# Patient Record
Sex: MALE | Race: BLACK OR AFRICAN AMERICAN | Employment: FULL TIME | URBAN - METROPOLITAN AREA
[De-identification: names, ages, dates, MRNs, and addresses within clinical notes are randomized per-mention and may not be internally consistent; named-entity substitution may affect disease eponyms.]

---

## 2017-09-30 ENCOUNTER — HOSPITAL ENCOUNTER (EMERGENCY)
Age: 27
Discharge: HOME | End: 2017-09-30
Attending: EMERGENCY MEDICINE
Payer: COMMERCIAL

## 2017-09-30 ENCOUNTER — APPOINTMENT (OUTPATIENT)
Dept: GENERAL RADIOLOGY | Age: 27
End: 2017-09-30
Payer: COMMERCIAL

## 2017-09-30 VITALS
DIASTOLIC BLOOD PRESSURE: 82 MMHG | OXYGEN SATURATION: 100 % | TEMPERATURE: 99.2 F | SYSTOLIC BLOOD PRESSURE: 143 MMHG | WEIGHT: 209 LBS | HEART RATE: 98 BPM | RESPIRATION RATE: 16 BRPM | BODY MASS INDEX: 29.26 KG/M2 | HEIGHT: 71 IN
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
## 2017-09-30 DIAGNOSIS — M54.2 NECK PAIN: Primary | ICD-10-CM

## 2017-09-30 DIAGNOSIS — G44.319 ACUTE POST-TRAUMATIC HEADACHE, NOT INTRACTABLE: ICD-10-CM

## 2017-09-30 PROCEDURE — 6370000000 HC RX 637 (ALT 250 FOR IP): Performed by: EMERGENCY MEDICINE

## 2017-09-30 PROCEDURE — 72040 X-RAY EXAM NECK SPINE 2-3 VW: CPT

## 2017-09-30 PROCEDURE — 99283 EMERGENCY DEPT VISIT LOW MDM: CPT

## 2017-09-30 RX ORDER — IBUPROFEN 800 MG/1
800 TABLET ORAL ONCE
Status: COMPLETED | OUTPATIENT
Start: 2017-09-30 | End: 2017-09-30

## 2017-09-30 RX ORDER — DIAZEPAM 5 MG/1
5 TABLET ORAL ONCE
Status: COMPLETED | OUTPATIENT
Start: 2017-09-30 | End: 2017-09-30

## 2017-09-30 RX ORDER — CYCLOBENZAPRINE HCL 10 MG
10 TABLET ORAL 3 TIMES DAILY PRN
Qty: 12 TABLET | Refills: 0 | Status: SHIPPED | OUTPATIENT
Start: 2017-09-30 | End: 2017-10-10

## 2017-09-30 RX ADMIN — IBUPROFEN 800 MG: 800 TABLET, FILM COATED ORAL at 21:56

## 2017-09-30 RX ADMIN — DIAZEPAM 5 MG: 5 TABLET ORAL at 23:18

## 2017-09-30 ASSESSMENT — ENCOUNTER SYMPTOMS
NAUSEA: 0
SINUS PRESSURE: 0
BACK PAIN: 0
DIARRHEA: 0
COUGH: 0
SHORTNESS OF BREATH: 0
RHINORRHEA: 0
VOMITING: 0
BLOOD IN STOOL: 0
SORE THROAT: 0
ABDOMINAL PAIN: 0
CONSTIPATION: 0
PHOTOPHOBIA: 0

## 2017-09-30 ASSESSMENT — PAIN DESCRIPTION - LOCATION: LOCATION: NECK

## 2017-09-30 ASSESSMENT — PAIN SCALES - GENERAL
PAINLEVEL_OUTOF10: 9
PAINLEVEL_OUTOF10: 6
PAINLEVEL_OUTOF10: 9
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
## 2017-10-01 NOTE — ED NOTES
Continue to wait for rolan [place], [facility]  09/30/17 1912
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
## 2018-02-28 ENCOUNTER — TELEPHONE (OUTPATIENT)
Dept: FAMILY MEDICINE CLINIC | Age: 28
End: 2018-02-28

## 2019-03-03 ENCOUNTER — HOSPITAL ENCOUNTER (EMERGENCY)
Age: 29
Discharge: HOME | End: 2019-03-04
Attending: EMERGENCY MEDICINE
Payer: COMMERCIAL

## 2019-03-03 VITALS
TEMPERATURE: 98.9 F | RESPIRATION RATE: 16 BRPM | BODY MASS INDEX: 30.14 KG/M2 | WEIGHT: 215.3 LBS | SYSTOLIC BLOOD PRESSURE: 128 MMHG | HEIGHT: 71 IN | DIASTOLIC BLOOD PRESSURE: 100 MMHG | OXYGEN SATURATION: 100 % | HEART RATE: 78 BPM

## 2019-03-03 DIAGNOSIS — K62.89 RECTAL PAIN: ICD-10-CM

## 2019-03-03 DIAGNOSIS — N34.2 URETHRITIS: Primary | ICD-10-CM

## 2019-03-03 PROCEDURE — 99283 EMERGENCY DEPT VISIT LOW MDM: CPT

## 2019-03-03 PROCEDURE — 6370000000 HC RX 637 (ALT 250 FOR IP): Performed by: EMERGENCY MEDICINE

## 2019-03-03 PROCEDURE — 87491 CHLMYD TRACH DNA AMP PROBE: CPT

## 2019-03-03 PROCEDURE — 6360000002 HC RX W HCPCS: Performed by: EMERGENCY MEDICINE

## 2019-03-03 PROCEDURE — 81001 URINALYSIS AUTO W/SCOPE: CPT

## 2019-03-03 PROCEDURE — 87591 N.GONORRHOEAE DNA AMP PROB: CPT

## 2019-03-03 PROCEDURE — 96372 THER/PROPH/DIAG INJ SC/IM: CPT

## 2019-03-03 RX ORDER — AZITHROMYCIN 250 MG/1
1000 TABLET, FILM COATED ORAL ONCE
Status: COMPLETED | OUTPATIENT
Start: 2019-03-04 | End: 2019-03-03

## 2019-03-03 RX ORDER — CEFTRIAXONE SODIUM 250 MG/1
250 INJECTION, POWDER, FOR SOLUTION INTRAMUSCULAR; INTRAVENOUS ONCE
Status: COMPLETED | OUTPATIENT
Start: 2019-03-04 | End: 2019-03-03

## 2019-03-03 RX ADMIN — AZITHROMYCIN 1000 MG: 250 TABLET, FILM COATED ORAL at 23:55

## 2019-03-03 RX ADMIN — CEFTRIAXONE SODIUM 250 MG: 250 INJECTION, POWDER, FOR SOLUTION INTRAMUSCULAR; INTRAVENOUS at 23:55

## 2019-03-03 ASSESSMENT — PAIN SCALES - GENERAL: PAINLEVEL_OUTOF10: 8

## 2019-03-04 LAB
-: ABNORMAL
AMORPHOUS: ABNORMAL
BACTERIA: ABNORMAL
BILIRUBIN URINE: NEGATIVE
CASTS UA: ABNORMAL /LPF
COLOR: YELLOW
COMMENT UA: ABNORMAL
CRYSTALS, UA: ABNORMAL /HPF
EPITHELIAL CELLS UA: ABNORMAL /HPF (ref 0–5)
GLUCOSE URINE: NEGATIVE
KETONES, URINE: NEGATIVE
LEUKOCYTE ESTERASE, URINE: NEGATIVE
MUCUS: ABNORMAL
NITRITE, URINE: NEGATIVE
OTHER OBSERVATIONS UA: ABNORMAL
PH UA: 6 (ref 5–8)
PROTEIN UA: NEGATIVE
RBC UA: ABNORMAL /HPF (ref 0–2)
RENAL EPITHELIAL, UA: ABNORMAL /HPF
SPECIFIC GRAVITY UA: 1.02 (ref 1–1.03)
TRICHOMONAS: ABNORMAL
TURBIDITY: CLEAR
URINE HGB: ABNORMAL
UROBILINOGEN, URINE: NORMAL
WBC UA: ABNORMAL /HPF (ref 0–5)
YEAST: ABNORMAL

## 2019-03-04 ASSESSMENT — ENCOUNTER SYMPTOMS
EYE PAIN: 0
ABDOMINAL DISTENTION: 0
SHORTNESS OF BREATH: 0
ABDOMINAL PAIN: 0
FACIAL SWELLING: 0
BACK PAIN: 0
CHEST TIGHTNESS: 0
EYE DISCHARGE: 0

## 2019-03-05 LAB
C. TRACHOMATIS DNA ,URINE: NEGATIVE
N. GONORRHOEAE DNA, URINE: NEGATIVE
SPECIMEN DESCRIPTION: NORMAL

## 2019-07-28 ENCOUNTER — HOSPITAL ENCOUNTER (EMERGENCY)
Age: 29
Discharge: HOME | End: 2019-07-28
Attending: EMERGENCY MEDICINE
Payer: COMMERCIAL

## 2019-07-28 VITALS
DIASTOLIC BLOOD PRESSURE: 71 MMHG | HEART RATE: 65 BPM | RESPIRATION RATE: 16 BRPM | TEMPERATURE: 97.7 F | SYSTOLIC BLOOD PRESSURE: 121 MMHG | OXYGEN SATURATION: 100 %

## 2019-07-28 DIAGNOSIS — R30.0 DYSURIA: Primary | ICD-10-CM

## 2019-07-28 LAB
-: NORMAL
AMORPHOUS: NORMAL
BACTERIA: NORMAL
BILIRUBIN URINE: NEGATIVE
CASTS UA: NORMAL /LPF (ref 0–8)
COLOR: YELLOW
COMMENT UA: ABNORMAL
CRYSTALS, UA: NORMAL /HPF
DIRECT EXAM: NORMAL
EPITHELIAL CELLS UA: NORMAL /HPF (ref 0–5)
GLUCOSE URINE: NEGATIVE
HIV AG/AB: NONREACTIVE
KETONES, URINE: NEGATIVE
LEUKOCYTE ESTERASE, URINE: NEGATIVE
Lab: NORMAL
MUCUS: NORMAL
NITRITE, URINE: NEGATIVE
OTHER OBSERVATIONS UA: NORMAL
PH UA: 5.5 (ref 5–8)
PROTEIN UA: NEGATIVE
RBC UA: NORMAL /HPF (ref 0–4)
RENAL EPITHELIAL, UA: NORMAL /HPF
SPECIFIC GRAVITY UA: 1.01 (ref 1–1.03)
SPECIMEN DESCRIPTION: NORMAL
T. PALLIDUM, IGG: NONREACTIVE
TRICHOMONAS: NORMAL
TURBIDITY: CLEAR
URINE HGB: ABNORMAL
UROBILINOGEN, URINE: NORMAL
WBC UA: NORMAL /HPF (ref 0–5)
YEAST: NORMAL

## 2019-07-28 PROCEDURE — 87491 CHLMYD TRACH DNA AMP PROBE: CPT

## 2019-07-28 PROCEDURE — 86780 TREPONEMA PALLIDUM: CPT

## 2019-07-28 PROCEDURE — 87591 N.GONORRHOEAE DNA AMP PROB: CPT

## 2019-07-28 PROCEDURE — 2580000003 HC RX 258

## 2019-07-28 PROCEDURE — 81001 URINALYSIS AUTO W/SCOPE: CPT

## 2019-07-28 PROCEDURE — 99283 EMERGENCY DEPT VISIT LOW MDM: CPT

## 2019-07-28 PROCEDURE — 87389 HIV-1 AG W/HIV-1&-2 AB AG IA: CPT

## 2019-07-28 PROCEDURE — 6370000000 HC RX 637 (ALT 250 FOR IP): Performed by: STUDENT IN AN ORGANIZED HEALTH CARE EDUCATION/TRAINING PROGRAM

## 2019-07-28 PROCEDURE — 6360000002 HC RX W HCPCS: Performed by: STUDENT IN AN ORGANIZED HEALTH CARE EDUCATION/TRAINING PROGRAM

## 2019-07-28 PROCEDURE — 87210 SMEAR WET MOUNT SALINE/INK: CPT

## 2019-07-28 PROCEDURE — 96372 THER/PROPH/DIAG INJ SC/IM: CPT

## 2019-07-28 RX ORDER — 0.9 % SODIUM CHLORIDE 0.9 %
VIAL (ML) INJECTION
Status: COMPLETED
Start: 2019-07-28 | End: 2019-07-28

## 2019-07-28 RX ORDER — CEFTRIAXONE SODIUM 250 MG/1
250 INJECTION, POWDER, FOR SOLUTION INTRAMUSCULAR; INTRAVENOUS ONCE
Status: COMPLETED | OUTPATIENT
Start: 2019-07-28 | End: 2019-07-28

## 2019-07-28 RX ORDER — AZITHROMYCIN 250 MG/1
1000 TABLET, FILM COATED ORAL ONCE
Status: COMPLETED | OUTPATIENT
Start: 2019-07-28 | End: 2019-07-28

## 2019-07-28 RX ADMIN — CEFTRIAXONE SODIUM 250 MG: 250 INJECTION, POWDER, FOR SOLUTION INTRAMUSCULAR; INTRAVENOUS at 06:41

## 2019-07-28 RX ADMIN — SODIUM CHLORIDE 10 ML: 9 INJECTION, SOLUTION INTRAMUSCULAR; INTRAVENOUS; SUBCUTANEOUS at 06:42

## 2019-07-28 RX ADMIN — AZITHROMYCIN 1000 MG: 250 TABLET, FILM COATED ORAL at 06:42

## 2019-07-28 ASSESSMENT — ENCOUNTER SYMPTOMS
VOMITING: 0
ABDOMINAL PAIN: 0
NAUSEA: 0
SHORTNESS OF BREATH: 0

## 2019-07-28 NOTE — ED TRIAGE NOTES
C/o burning on urination started saturday, denies any discharge. Provided speci cup for for urine sample. Dr Daysi Cade at bedside.

## 2021-04-15 ENCOUNTER — HOSPITAL ENCOUNTER (INPATIENT)
Age: 31
LOS: 1 days | Discharge: HOME | DRG: 280 | End: 2021-04-16
Attending: EMERGENCY MEDICINE | Admitting: INTERNAL MEDICINE
Payer: COMMERCIAL

## 2021-04-15 ENCOUNTER — APPOINTMENT (OUTPATIENT)
Dept: GENERAL RADIOLOGY | Age: 31
DRG: 280 | End: 2021-04-15

## 2021-04-15 DIAGNOSIS — I21.4 NSTEMI (NON-ST ELEVATED MYOCARDIAL INFARCTION) (HCC): Primary | ICD-10-CM

## 2021-04-15 PROBLEM — I40.9 ACUTE MYOCARDITIS: Status: ACTIVE | Noted: 2021-04-15

## 2021-04-15 LAB
ABSOLUTE EOS #: <0.03 K/UL (ref 0–0.44)
ABSOLUTE IMMATURE GRANULOCYTE: 0.02 K/UL (ref 0–0.3)
ABSOLUTE LYMPH #: 2.59 K/UL (ref 1.1–3.7)
ABSOLUTE MONO #: 0.58 K/UL (ref 0.1–1.2)
ALBUMIN SERPL-MCNC: 4.1 G/DL (ref 3.5–5.2)
ALBUMIN/GLOBULIN RATIO: ABNORMAL (ref 1–2.5)
ALP BLD-CCNC: 67 U/L (ref 40–129)
ALT SERPL-CCNC: 28 U/L (ref 5–41)
AMPHETAMINE SCREEN URINE: NEGATIVE
AMYLASE: 60 U/L (ref 28–100)
ANION GAP SERPL CALCULATED.3IONS-SCNC: 12 MMOL/L (ref 9–17)
AST SERPL-CCNC: 48 U/L
BARBITURATE SCREEN URINE: NEGATIVE
BASOPHILS # BLD: 0 % (ref 0–2)
BASOPHILS ABSOLUTE: <0.03 K/UL (ref 0–0.2)
BENZODIAZEPINE SCREEN, URINE: NEGATIVE
BILIRUB SERPL-MCNC: 0.23 MG/DL (ref 0.3–1.2)
BILIRUBIN DIRECT: 0.08 MG/DL
BILIRUBIN, INDIRECT: 0.15 MG/DL (ref 0–1)
BUN BLDV-MCNC: 9 MG/DL (ref 6–20)
BUN/CREAT BLD: 12 (ref 9–20)
BUPRENORPHINE URINE: NORMAL
C-REACTIVE PROTEIN: 39.2 MG/L (ref 0–5)
CALCIUM SERPL-MCNC: 8.9 MG/DL (ref 8.6–10.4)
CANNABINOID SCREEN URINE: NEGATIVE
CHLORIDE BLD-SCNC: 99 MMOL/L (ref 98–107)
CO2: 26 MMOL/L (ref 20–31)
COCAINE METABOLITE, URINE: NEGATIVE
CREAT SERPL-MCNC: 0.73 MG/DL (ref 0.7–1.2)
D-DIMER QUANTITATIVE: 0.34 MG/L FEU (ref 0–0.59)
DIFFERENTIAL TYPE: ABNORMAL
EKG ATRIAL RATE: 82 BPM
EKG ATRIAL RATE: 89 BPM
EKG P AXIS: 44 DEGREES
EKG P AXIS: 45 DEGREES
EKG P-R INTERVAL: 138 MS
EKG P-R INTERVAL: 140 MS
EKG Q-T INTERVAL: 364 MS
EKG Q-T INTERVAL: 368 MS
EKG QRS DURATION: 84 MS
EKG QRS DURATION: 84 MS
EKG QTC CALCULATION (BAZETT): 425 MS
EKG QTC CALCULATION (BAZETT): 447 MS
EKG R AXIS: -8 DEGREES
EKG R AXIS: 16 DEGREES
EKG T AXIS: 28 DEGREES
EKG T AXIS: 47 DEGREES
EKG VENTRICULAR RATE: 82 BPM
EKG VENTRICULAR RATE: 89 BPM
EOSINOPHILS RELATIVE PERCENT: 0 % (ref 1–4)
GFR AFRICAN AMERICAN: >60 ML/MIN
GFR NON-AFRICAN AMERICAN: >60 ML/MIN
GFR SERPL CREATININE-BSD FRML MDRD: ABNORMAL ML/MIN/{1.73_M2}
GFR SERPL CREATININE-BSD FRML MDRD: ABNORMAL ML/MIN/{1.73_M2}
GLOBULIN: ABNORMAL G/DL (ref 1.5–3.8)
GLUCOSE BLD-MCNC: 129 MG/DL (ref 70–99)
HCT VFR BLD CALC: 40.2 % (ref 40.7–50.3)
HEMOGLOBIN: 13.6 G/DL (ref 13–17)
IMMATURE GRANULOCYTES: 0 %
INR BLD: 1
LACTIC ACID: 0.7 MMOL/L (ref 0.5–2.2)
LACTIC ACID: 1 MMOL/L (ref 0.5–2.2)
LIPASE: 26 U/L (ref 13–60)
LV EF: 53 %
LV EF: 55 %
LVEF MODALITY: NORMAL
LVEF MODALITY: NORMAL
LYMPHOCYTES # BLD: 40 % (ref 24–43)
MAGNESIUM: 2.1 MG/DL (ref 1.6–2.6)
MCH RBC QN AUTO: 32.2 PG (ref 25.2–33.5)
MCHC RBC AUTO-ENTMCNC: 33.8 G/DL (ref 28.4–34.8)
MCV RBC AUTO: 95 FL (ref 82.6–102.9)
MDMA URINE: NORMAL
METHADONE SCREEN, URINE: NEGATIVE
METHAMPHETAMINE, URINE: NORMAL
MONOCYTES # BLD: 9 % (ref 3–12)
NRBC AUTOMATED: 0 PER 100 WBC
OPIATES, URINE: NEGATIVE
OXYCODONE SCREEN URINE: NEGATIVE
PARTIAL THROMBOPLASTIN TIME: 37.7 SEC (ref 23.9–33.8)
PDW BLD-RTO: 12.6 % (ref 11.8–14.4)
PHENCYCLIDINE, URINE: NEGATIVE
PLATELET # BLD: 151 K/UL (ref 138–453)
PLATELET ESTIMATE: ABNORMAL
PMV BLD AUTO: 11.7 FL (ref 8.1–13.5)
POTASSIUM SERPL-SCNC: 3.5 MMOL/L (ref 3.7–5.3)
PROCALCITONIN: 0.08 NG/ML
PROPOXYPHENE, URINE: NORMAL
PROTHROMBIN TIME: 12.6 SEC (ref 11.5–14.2)
RBC # BLD: 4.23 M/UL (ref 4.21–5.77)
RBC # BLD: ABNORMAL 10*6/UL
SEDIMENTATION RATE, ERYTHROCYTE: 27 MM (ref 0–15)
SEG NEUTROPHILS: 50 % (ref 36–65)
SEGMENTED NEUTROPHILS ABSOLUTE COUNT: 3.23 K/UL (ref 1.5–8.1)
SODIUM BLD-SCNC: 137 MMOL/L (ref 135–144)
TEST INFORMATION: NORMAL
TOTAL PROTEIN: 6.6 G/DL (ref 6.4–8.3)
TRICYCLIC ANTIDEPRESSANTS, UR: NORMAL
TROPONIN INTERP: ABNORMAL
TROPONIN T: ABNORMAL NG/ML
TROPONIN, HIGH SENSITIVITY: 775 NG/L (ref 0–22)
TROPONIN, HIGH SENSITIVITY: 805 NG/L (ref 0–22)
TROPONIN, HIGH SENSITIVITY: 970 NG/L (ref 0–22)
TROPONIN, HIGH SENSITIVITY: 995 NG/L (ref 0–22)
WBC # BLD: 6.5 K/UL (ref 3.5–11.3)
WBC # BLD: ABNORMAL 10*3/UL

## 2021-04-15 PROCEDURE — 84484 ASSAY OF TROPONIN QUANT: CPT

## 2021-04-15 PROCEDURE — APPNB30 APP NON BILLABLE TIME 0-30 MINS: Performed by: NURSE PRACTITIONER

## 2021-04-15 PROCEDURE — C1894 INTRO/SHEATH, NON-LASER: HCPCS

## 2021-04-15 PROCEDURE — 85730 THROMBOPLASTIN TIME PARTIAL: CPT

## 2021-04-15 PROCEDURE — APPSS45 APP SPLIT SHARED TIME 31-45 MINUTES: Performed by: NURSE PRACTITIONER

## 2021-04-15 PROCEDURE — 6370000000 HC RX 637 (ALT 250 FOR IP): Performed by: NURSE PRACTITIONER

## 2021-04-15 PROCEDURE — 86665 EPSTEIN-BARR CAPSID VCA: CPT

## 2021-04-15 PROCEDURE — 84145 PROCALCITONIN (PCT): CPT

## 2021-04-15 PROCEDURE — 93306 TTE W/DOPPLER COMPLETE: CPT

## 2021-04-15 PROCEDURE — 2709999900 HC NON-CHARGEABLE SUPPLY

## 2021-04-15 PROCEDURE — 86658 ENTEROVIRUS ANTIBODY: CPT

## 2021-04-15 PROCEDURE — B2151ZZ FLUOROSCOPY OF LEFT HEART USING LOW OSMOLAR CONTRAST: ICD-10-PCS | Performed by: INTERNAL MEDICINE

## 2021-04-15 PROCEDURE — 80048 BASIC METABOLIC PNL TOTAL CA: CPT

## 2021-04-15 PROCEDURE — 85610 PROTHROMBIN TIME: CPT

## 2021-04-15 PROCEDURE — 80307 DRUG TEST PRSMV CHEM ANLYZR: CPT

## 2021-04-15 PROCEDURE — 6360000004 HC RX CONTRAST MEDICATION

## 2021-04-15 PROCEDURE — 36415 COLL VENOUS BLD VENIPUNCTURE: CPT

## 2021-04-15 PROCEDURE — 85025 COMPLETE CBC W/AUTO DIFF WBC: CPT

## 2021-04-15 PROCEDURE — 71045 X-RAY EXAM CHEST 1 VIEW: CPT

## 2021-04-15 PROCEDURE — 85652 RBC SED RATE AUTOMATED: CPT

## 2021-04-15 PROCEDURE — 83735 ASSAY OF MAGNESIUM: CPT

## 2021-04-15 PROCEDURE — 86663 EPSTEIN-BARR ANTIBODY: CPT

## 2021-04-15 PROCEDURE — 85379 FIBRIN DEGRADATION QUANT: CPT

## 2021-04-15 PROCEDURE — 80074 ACUTE HEPATITIS PANEL: CPT

## 2021-04-15 PROCEDURE — 87040 BLOOD CULTURE FOR BACTERIA: CPT

## 2021-04-15 PROCEDURE — U0003 INFECTIOUS AGENT DETECTION BY NUCLEIC ACID (DNA OR RNA); SEVERE ACUTE RESPIRATORY SYNDROME CORONAVIRUS 2 (SARS-COV-2) (CORONAVIRUS DISEASE [COVID-19]), AMPLIFIED PROBE TECHNIQUE, MAKING USE OF HIGH THROUGHPUT TECHNOLOGIES AS DESCRIBED BY CMS-2020-01-R: HCPCS

## 2021-04-15 PROCEDURE — 96375 TX/PRO/DX INJ NEW DRUG ADDON: CPT

## 2021-04-15 PROCEDURE — 86038 ANTINUCLEAR ANTIBODIES: CPT

## 2021-04-15 PROCEDURE — 6360000002 HC RX W HCPCS

## 2021-04-15 PROCEDURE — 2060000000 HC ICU INTERMEDIATE R&B

## 2021-04-15 PROCEDURE — 93005 ELECTROCARDIOGRAM TRACING: CPT | Performed by: EMERGENCY MEDICINE

## 2021-04-15 PROCEDURE — 86140 C-REACTIVE PROTEIN: CPT

## 2021-04-15 PROCEDURE — 2580000003 HC RX 258: Performed by: INTERNAL MEDICINE

## 2021-04-15 PROCEDURE — 99222 1ST HOSP IP/OBS MODERATE 55: CPT | Performed by: NURSE PRACTITIONER

## 2021-04-15 PROCEDURE — 82150 ASSAY OF AMYLASE: CPT

## 2021-04-15 PROCEDURE — 80076 HEPATIC FUNCTION PANEL: CPT

## 2021-04-15 PROCEDURE — 6370000000 HC RX 637 (ALT 250 FOR IP): Performed by: EMERGENCY MEDICINE

## 2021-04-15 PROCEDURE — 99283 EMERGENCY DEPT VISIT LOW MDM: CPT

## 2021-04-15 PROCEDURE — 86644 CMV ANTIBODY: CPT

## 2021-04-15 PROCEDURE — 96365 THER/PROPH/DIAG IV INF INIT: CPT

## 2021-04-15 PROCEDURE — 83605 ASSAY OF LACTIC ACID: CPT

## 2021-04-15 PROCEDURE — 83690 ASSAY OF LIPASE: CPT

## 2021-04-15 PROCEDURE — 2500000003 HC RX 250 WO HCPCS

## 2021-04-15 PROCEDURE — B2111ZZ FLUOROSCOPY OF MULTIPLE CORONARY ARTERIES USING LOW OSMOLAR CONTRAST: ICD-10-PCS | Performed by: INTERNAL MEDICINE

## 2021-04-15 PROCEDURE — U0005 INFEC AGEN DETEC AMPLI PROBE: HCPCS

## 2021-04-15 PROCEDURE — 4A023N7 MEASUREMENT OF CARDIAC SAMPLING AND PRESSURE, LEFT HEART, PERCUTANEOUS APPROACH: ICD-10-PCS | Performed by: INTERNAL MEDICINE

## 2021-04-15 PROCEDURE — 86664 EPSTEIN-BARR NUCLEAR ANTIGEN: CPT

## 2021-04-15 PROCEDURE — 6360000002 HC RX W HCPCS: Performed by: EMERGENCY MEDICINE

## 2021-04-15 RX ORDER — NITROGLYCERIN 0.4 MG/1
0.4 TABLET SUBLINGUAL EVERY 5 MIN PRN
Status: DISCONTINUED | OUTPATIENT
Start: 2021-04-15 | End: 2021-04-16 | Stop reason: HOSPADM

## 2021-04-15 RX ORDER — ONDANSETRON 2 MG/ML
4 INJECTION INTRAMUSCULAR; INTRAVENOUS EVERY 6 HOURS PRN
Status: DISCONTINUED | OUTPATIENT
Start: 2021-04-15 | End: 2021-04-16 | Stop reason: HOSPADM

## 2021-04-15 RX ORDER — HEPARIN SODIUM 1000 [USP'U]/ML
4000 INJECTION, SOLUTION INTRAVENOUS; SUBCUTANEOUS ONCE
Status: COMPLETED | OUTPATIENT
Start: 2021-04-15 | End: 2021-04-15

## 2021-04-15 RX ORDER — SODIUM CHLORIDE 9 MG/ML
INJECTION, SOLUTION INTRAVENOUS CONTINUOUS
Status: ACTIVE | OUTPATIENT
Start: 2021-04-15 | End: 2021-04-15

## 2021-04-15 RX ORDER — MORPHINE SULFATE 4 MG/ML
4 INJECTION, SOLUTION INTRAMUSCULAR; INTRAVENOUS ONCE
Status: COMPLETED | OUTPATIENT
Start: 2021-04-15 | End: 2021-04-15

## 2021-04-15 RX ORDER — ATORVASTATIN CALCIUM 40 MG/1
40 TABLET, FILM COATED ORAL NIGHTLY
Status: DISCONTINUED | OUTPATIENT
Start: 2021-04-15 | End: 2021-04-16 | Stop reason: HOSPADM

## 2021-04-15 RX ORDER — HEPARIN SODIUM 1000 [USP'U]/ML
2000 INJECTION, SOLUTION INTRAVENOUS; SUBCUTANEOUS PRN
Status: DISCONTINUED | OUTPATIENT
Start: 2021-04-15 | End: 2021-04-15

## 2021-04-15 RX ORDER — HEPARIN SODIUM 1000 [USP'U]/ML
2000 INJECTION, SOLUTION INTRAVENOUS; SUBCUTANEOUS PRN
Status: DISCONTINUED | OUTPATIENT
Start: 2021-04-15 | End: 2021-04-15 | Stop reason: SDUPTHER

## 2021-04-15 RX ORDER — HEPARIN SODIUM 10000 [USP'U]/100ML
460-1000 INJECTION, SOLUTION INTRAVENOUS CONTINUOUS
Status: DISCONTINUED | OUTPATIENT
Start: 2021-04-15 | End: 2021-04-15 | Stop reason: SDUPTHER

## 2021-04-15 RX ORDER — POTASSIUM CHLORIDE 20 MEQ/1
40 TABLET, EXTENDED RELEASE ORAL ONCE
Status: COMPLETED | OUTPATIENT
Start: 2021-04-15 | End: 2021-04-15

## 2021-04-15 RX ORDER — SODIUM CHLORIDE 0.9 % (FLUSH) 0.9 %
10 SYRINGE (ML) INJECTION PRN
Status: DISCONTINUED | OUTPATIENT
Start: 2021-04-15 | End: 2021-04-15 | Stop reason: SDUPTHER

## 2021-04-15 RX ORDER — ACETAMINOPHEN 325 MG/1
650 TABLET ORAL EVERY 4 HOURS PRN
Status: DISCONTINUED | OUTPATIENT
Start: 2021-04-15 | End: 2021-04-16 | Stop reason: HOSPADM

## 2021-04-15 RX ORDER — SODIUM CHLORIDE 9 MG/ML
25 INJECTION, SOLUTION INTRAVENOUS PRN
Status: DISCONTINUED | OUTPATIENT
Start: 2021-04-15 | End: 2021-04-16 | Stop reason: HOSPADM

## 2021-04-15 RX ORDER — CLINDAMYCIN HYDROCHLORIDE 150 MG/1
300 CAPSULE ORAL EVERY 6 HOURS SCHEDULED
Status: DISCONTINUED | OUTPATIENT
Start: 2021-04-15 | End: 2021-04-16 | Stop reason: HOSPADM

## 2021-04-15 RX ORDER — ASPIRIN 81 MG/1
324 TABLET, CHEWABLE ORAL ONCE
Status: COMPLETED | OUTPATIENT
Start: 2021-04-15 | End: 2021-04-15

## 2021-04-15 RX ORDER — ACETAMINOPHEN 325 MG/1
650 TABLET ORAL EVERY 6 HOURS PRN
Status: DISCONTINUED | OUTPATIENT
Start: 2021-04-15 | End: 2021-04-15 | Stop reason: SDUPTHER

## 2021-04-15 RX ORDER — LISINOPRIL 5 MG/1
5 TABLET ORAL DAILY
Status: DISCONTINUED | OUTPATIENT
Start: 2021-04-15 | End: 2021-04-16 | Stop reason: HOSPADM

## 2021-04-15 RX ORDER — SODIUM CHLORIDE 0.9 % (FLUSH) 0.9 %
5-40 SYRINGE (ML) INJECTION EVERY 12 HOURS SCHEDULED
Status: DISCONTINUED | OUTPATIENT
Start: 2021-04-15 | End: 2021-04-16 | Stop reason: HOSPADM

## 2021-04-15 RX ORDER — HEPARIN SODIUM 1000 [USP'U]/ML
4000 INJECTION, SOLUTION INTRAVENOUS; SUBCUTANEOUS PRN
Status: DISCONTINUED | OUTPATIENT
Start: 2021-04-15 | End: 2021-04-15 | Stop reason: SDUPTHER

## 2021-04-15 RX ORDER — HEPARIN SODIUM 1000 [USP'U]/ML
4000 INJECTION, SOLUTION INTRAVENOUS; SUBCUTANEOUS PRN
Status: DISCONTINUED | OUTPATIENT
Start: 2021-04-15 | End: 2021-04-15

## 2021-04-15 RX ORDER — ACETAMINOPHEN 650 MG/1
650 SUPPOSITORY RECTAL EVERY 6 HOURS PRN
Status: DISCONTINUED | OUTPATIENT
Start: 2021-04-15 | End: 2021-04-16 | Stop reason: HOSPADM

## 2021-04-15 RX ORDER — POTASSIUM CHLORIDE 7.45 MG/ML
10 INJECTION INTRAVENOUS PRN
Status: DISCONTINUED | OUTPATIENT
Start: 2021-04-15 | End: 2021-04-16 | Stop reason: HOSPADM

## 2021-04-15 RX ORDER — HEPARIN SODIUM 1000 [USP'U]/ML
60 INJECTION, SOLUTION INTRAVENOUS; SUBCUTANEOUS ONCE
Status: CANCELLED | OUTPATIENT
Start: 2021-04-15 | End: 2021-04-15

## 2021-04-15 RX ORDER — FAMOTIDINE 20 MG/1
20 TABLET, FILM COATED ORAL 2 TIMES DAILY
Status: DISCONTINUED | OUTPATIENT
Start: 2021-04-15 | End: 2021-04-16 | Stop reason: HOSPADM

## 2021-04-15 RX ORDER — POTASSIUM CHLORIDE 20 MEQ/1
40 TABLET, EXTENDED RELEASE ORAL PRN
Status: DISCONTINUED | OUTPATIENT
Start: 2021-04-15 | End: 2021-04-16 | Stop reason: HOSPADM

## 2021-04-15 RX ORDER — SODIUM CHLORIDE 0.9 % (FLUSH) 0.9 %
5-40 SYRINGE (ML) INJECTION EVERY 12 HOURS SCHEDULED
Status: DISCONTINUED | OUTPATIENT
Start: 2021-04-15 | End: 2021-04-15 | Stop reason: SDUPTHER

## 2021-04-15 RX ORDER — ALPRAZOLAM 0.5 MG/1
0.5 TABLET ORAL ONCE
Status: COMPLETED | OUTPATIENT
Start: 2021-04-15 | End: 2021-04-15

## 2021-04-15 RX ORDER — ASPIRIN 81 MG/1
81 TABLET, CHEWABLE ORAL DAILY
Status: DISCONTINUED | OUTPATIENT
Start: 2021-04-16 | End: 2021-04-15

## 2021-04-15 RX ORDER — ONDANSETRON 2 MG/ML
4 INJECTION INTRAMUSCULAR; INTRAVENOUS EVERY 6 HOURS PRN
Status: DISCONTINUED | OUTPATIENT
Start: 2021-04-15 | End: 2021-04-15 | Stop reason: SDUPTHER

## 2021-04-15 RX ORDER — PROMETHAZINE HYDROCHLORIDE 12.5 MG/1
12.5 TABLET ORAL EVERY 6 HOURS PRN
Status: DISCONTINUED | OUTPATIENT
Start: 2021-04-15 | End: 2021-04-16 | Stop reason: HOSPADM

## 2021-04-15 RX ORDER — MAGNESIUM SULFATE 1 G/100ML
1000 INJECTION INTRAVENOUS PRN
Status: DISCONTINUED | OUTPATIENT
Start: 2021-04-15 | End: 2021-04-16 | Stop reason: HOSPADM

## 2021-04-15 RX ORDER — ONDANSETRON 2 MG/ML
4 INJECTION INTRAMUSCULAR; INTRAVENOUS ONCE
Status: COMPLETED | OUTPATIENT
Start: 2021-04-15 | End: 2021-04-15

## 2021-04-15 RX ORDER — FENTANYL CITRATE 50 UG/ML
25 INJECTION, SOLUTION INTRAMUSCULAR; INTRAVENOUS
Status: ACTIVE | OUTPATIENT
Start: 2021-04-15 | End: 2021-04-15

## 2021-04-15 RX ORDER — SODIUM CHLORIDE 0.9 % (FLUSH) 0.9 %
5-40 SYRINGE (ML) INJECTION PRN
Status: DISCONTINUED | OUTPATIENT
Start: 2021-04-15 | End: 2021-04-16 | Stop reason: HOSPADM

## 2021-04-15 RX ORDER — HEPARIN SODIUM 10000 [USP'U]/100ML
5-30 INJECTION, SOLUTION INTRAVENOUS CONTINUOUS
Status: DISCONTINUED | OUTPATIENT
Start: 2021-04-15 | End: 2021-04-15

## 2021-04-15 RX ADMIN — ALPRAZOLAM 0.5 MG: 0.5 TABLET ORAL at 21:27

## 2021-04-15 RX ADMIN — HEPARIN SODIUM 4000 UNITS: 1000 INJECTION INTRAVENOUS; SUBCUTANEOUS at 10:35

## 2021-04-15 RX ADMIN — ONDANSETRON 4 MG: 2 INJECTION INTRAMUSCULAR; INTRAVENOUS at 10:35

## 2021-04-15 RX ADMIN — HEPARIN SODIUM 10.9 UNITS/KG/HR: 10000 INJECTION, SOLUTION INTRAVENOUS at 10:35

## 2021-04-15 RX ADMIN — METOPROLOL TARTRATE 25 MG: 25 TABLET, FILM COATED ORAL at 20:35

## 2021-04-15 RX ADMIN — METOPROLOL TARTRATE 25 MG: 25 TABLET, FILM COATED ORAL at 12:45

## 2021-04-15 RX ADMIN — CLINDAMYCIN HYDROCHLORIDE 300 MG: 150 CAPSULE ORAL at 12:45

## 2021-04-15 RX ADMIN — CLINDAMYCIN HYDROCHLORIDE 300 MG: 150 CAPSULE ORAL at 20:35

## 2021-04-15 RX ADMIN — ASPIRIN 324 MG: 81 TABLET, CHEWABLE ORAL at 10:35

## 2021-04-15 RX ADMIN — ATORVASTATIN CALCIUM 40 MG: 40 TABLET, FILM COATED ORAL at 20:35

## 2021-04-15 RX ADMIN — Medication 4 MG: at 10:52

## 2021-04-15 RX ADMIN — SODIUM CHLORIDE: 9 INJECTION, SOLUTION INTRAVENOUS at 16:20

## 2021-04-15 RX ADMIN — FAMOTIDINE 20 MG: 20 TABLET ORAL at 20:35

## 2021-04-15 RX ADMIN — POTASSIUM CHLORIDE 40 MEQ: 1500 TABLET, EXTENDED RELEASE ORAL at 11:55

## 2021-04-15 ASSESSMENT — ENCOUNTER SYMPTOMS
COLOR CHANGE: 0
EYE DISCHARGE: 0
PHOTOPHOBIA: 0
SHORTNESS OF BREATH: 0
WHEEZING: 0
ABDOMINAL DISTENTION: 0
EYE PAIN: 0
SINUS PAIN: 0
ABDOMINAL PAIN: 0
BACK PAIN: 0
SINUS PRESSURE: 0
CHEST TIGHTNESS: 1
FACIAL SWELLING: 0

## 2021-04-15 ASSESSMENT — PAIN SCALES - GENERAL
PAINLEVEL_OUTOF10: 0
PAINLEVEL_OUTOF10: 4

## 2021-04-15 NOTE — FLOWSHEET NOTE
Writer was called by ED registration to walk up family member for pt to [address] [place] [address] waiting room. Upon arrival other family members were in waiting room as well for pt. Writer offered them water and coffee for which they were greatly appreciative. Writer nurtured hope and ministry presence to family. Chaplains will remain available to offer spiritual and emotional support as needed. 04/15/21 1519   Encounter Summary   Services provided to: Family   Referral/Consult From: Multi-disciplinary team   Support System Family members   Continue Visiting   (4/15/21)   Complexity of Encounter Moderate   Length of Encounter 30 minutes   Spiritual Assessment Completed Yes   Routine   Type Initial   Assessment Calm; Approachable;Coping   Intervention Explored feelings, thoughts, concerns;Nurtured hope;Sustaining presence/ Ministry of presence;Develop care plan   Outcome Acceptance;Expressed gratitude;Expressed feelings/needs/concerns;Coping;Encouraged; Hopeful;Receptive     Electronically signed by Ana Richard on 4/15/2021 at 3:21 PM.  Aldo  [phone]

## 2021-04-15 NOTE — ED PROVIDER NOTES
EMERGENCY DEPARTMENT ENCOUNTER    Pt Name: Grady Roque  MRN: [id number]  [place] 1990  Date of evaluation: 4/15/21  CHIEF COMPLAINT       Chief Complaint   Patient presents with    Chest Pain     epigastric    Abdominal Pain     HISTORY OF PRESENT ILLNESS   HPI   Patient is a 77-year-old male who presented to the emergency department secondary to chest pain. Patient complains of chest pain that began on Tuesday while at rest localized to the midsternal region nonradiating no associated nausea vomiting or diaphoresis. Patient's states pain is been constant since onset gotten progressively worse, increases with inspiration. The pain 4 out of 10 on the pain scale. Initially thought it was related to food but he is not been eating. He was at work today and was sent home due to increased pain. Patient initially did not come in because \"in a black people not come to the doctor\". Patient denied recent travel. No family history of clotting disorder. Patient denies tobacco use occasionally wakes. Occasional alcohol use no drug use. Patient has a family history of hypertension and coronary artery disease. Hossein Daugherty He has never had a heart attack or had a stress test.  Spine patient denied nausea, vomiting, fevers or chills    REVIEW OF SYSTEMS     Review of Systems   Constitutional: Negative for chills, diaphoresis and fever. HENT: Negative for congestion, ear pain and facial swelling. Eyes: Negative for pain, discharge and visual disturbance. Respiratory: Positive for chest tightness. Negative for shortness of breath. Cardiovascular: Positive for chest pain. Negative for palpitations and leg swelling. Gastrointestinal: Negative for abdominal distention and abdominal pain. Genitourinary: Negative for difficulty urinating and flank pain. Musculoskeletal: Negative for back pain. Skin: Negative for wound. Neurological: Negative for dizziness, light-headedness and headaches.      PASTMEDICAL HISTORY   No past medical history on file. SURGICAL HISTORY     No past surgical history on file. CURRENT MEDICATIONS       Current Discharge Medication List      CONTINUE these medications which have NOT CHANGED    Details   ibuprofen (ADVIL;MOTRIN) 800 MG tablet Take 1 tablet by mouth every 8 hours as needed for Pain  Qty: 30 tablet, Refills: 0           ALLERGIES     has No Known Allergies. FAMILY HISTORY     has no family status information on file. SOCIAL HISTORY       Social History     Tobacco Use    Smoking status: Never Smoker    Smokeless tobacco: Never Used   Substance Use Topics    Alcohol use: No    Drug use: No     PHYSICAL EXAM     INITIAL VITALS: BP (!) 124/99   Pulse 87   Temp 98.3 °F (36.8 °C)   Resp 14   Ht 5' 10.98\" (1.803 m)   Wt 202 lb 11.2 oz (91.9 kg)   SpO2 97%   BMI 28.28 kg/m²    Physical Exam  Vitals signs and nursing note reviewed. Constitutional:       General: He is not in acute distress. Appearance: He is well-developed. He is not diaphoretic. HENT:      Head: Normocephalic and atraumatic. Eyes:      Pupils: Pupils are equal, round, and reactive to light. Neck:      Musculoskeletal: Normal range of motion and neck supple. Cardiovascular:      Rate and Rhythm: Normal rate and regular rhythm. Pulmonary:      Effort: Pulmonary effort is normal.      Breath sounds: Normal breath sounds. Abdominal:      General: Bowel sounds are normal.      Palpations: Abdomen is soft. Musculoskeletal: Normal range of motion. Skin:     General: Skin is warm. Capillary Refill: Capillary refill takes less than 2 seconds. Neurological:      Mental Status: He is alert and oriented to person, place, and time. MEDICAL DECISION MAKING:   Patient is a 63-year-old male who presented to the emergency room secondary to chest pain. EKG obtained on arrival normal sinus rhythm no acute ST wave elevations or findings consistent with STEMI.   Labs obtained including Placed This Encounter   Medications    aspirin chewable tablet 324 mg    morphine sulfate (PF) injection 4 mg    ondansetron (ZOFRAN) injection 4 mg    heparin (porcine) injection 4,000 Units    DISCONTD: heparin (porcine) injection 4,000 Units    DISCONTD: heparin (porcine) injection 2,000 Units    DISCONTD: heparin 25,000 units in dextrose 5% 250 mL (premix) infusion    potassium chloride (KLOR-CON M) extended release tablet 40 mEq    DISCONTD: sodium chloride flush 0.9 % injection 5-40 mL    DISCONTD: sodium chloride flush 0.9 % injection 10 mL    0.9 % sodium chloride infusion    OR Linked Order Group     potassium chloride (KLOR-CON M) extended release tablet 40 mEq     potassium bicarb-citric acid (EFFER-K) effervescent tablet 40 mEq     potassium chloride 10 mEq/100 mL IVPB (Peripheral Line)    magnesium sulfate 1000 mg in dextrose 5% 100 mL IVPB    famotidine (PEPCID) tablet 20 mg     May cancel order if already on H2 blocker/PPI alternative as a home medication to avoid duplication of therapy.     OR Linked Order Group     promethazine (PHENERGAN) tablet 12.5 mg     ondansetron (ZOFRAN) injection 4 mg    DISCONTD: acetaminophen (TYLENOL) tablet 650 mg    acetaminophen (TYLENOL) suppository 650 mg    magnesium hydroxide (MILK OF MAGNESIA) 400 MG/5ML suspension 30 mL    metoprolol tartrate (LOPRESSOR) tablet 25 mg    atorvastatin (LIPITOR) tablet 40 mg    DISCONTD: aspirin chewable tablet 81 mg    DISCONTD: heparin (porcine) injection 4,000 Units    DISCONTD: heparin (porcine) injection 2,000 Units    DISCONTD: heparin 25,000 units in dextrose 5% 250 mL (premix) infusion    nitroGLYCERIN (NITROSTAT) SL tablet 0.4 mg    lisinopril (PRINIVIL;ZESTRIL) tablet 5 mg    metoprolol tartrate (LOPRESSOR) tablet 25 mg    clindamycin (CLEOCIN) capsule 300 mg     Order Specific Question:   Antimicrobial Indications     Answer:   Endocarditis/Endovascular     Order Specific Question:

## 2021-04-15 NOTE — CONSULTS
Reason for Consult: Chest pain  Requesting Physician: Shay Crane MD    CHIEF COMPLAINT: Chest pain    History Obtained From:  patient, electronic medical record    HISTORY OF PRESENT ILLNESS:      The patient is a 31 y.o. male with no significant past medical history and does not take medication at home has never seen a cardiologist but has a strong family history of CAD and according to his sister who I talked on the phone with has had multiple male family members have  in their sleep from MIs at a younger age. His parents have no known history of CAD. He reports on Tuesday he developed chest burning that was mild at first and got worse over the past couple of days but was constant without associated shortness of breath or diaphoresis. He went to work today and his supervisor told him that he needed to be evaluated for his ongoing chest pain so the patient came to the ER and was found to have elevated troponin level. He denies palpitations, syncope, or near syncope. His chest pain was relieved with nitroglycerin and he has not had any recurrence of his symptoms. Past Medical History:    No past medical history on file. Past Surgical History:    No past surgical history on file. Home Medications:  Prior to Admission medications    Medication Sig Start Date End Date Taking?  Authorizing Provider   ibuprofen (ADVIL;MOTRIN) 800 MG tablet Take 1 tablet by mouth every 8 hours as needed for Pain 17   Molina Silva PA-C     Current Medications:    Current Facility-Administered Medications   Medication Dose Route Frequency Provider Last Rate Last Admin    heparin (porcine) injection 4,000 Units  4,000 Units Intravenous PRN Shay Crane MD        heparin (porcine) injection 2,000 Units  2,000 Units Intravenous PRN Shay Crane MD        heparin 25,000 units in dextrose 5% 250 mL (premix) infusion  460-1,000 Units/hr Intravenous Continuous Shay Crane MD 10 mL/hr at 04/15/21 1035 10.9 Units/kg/hr at 04/15/21 1035    potassium chloride (KLOR-CON M) extended release tablet 40 mEq  40 mEq Oral Once Otto Jurado MD         Current Outpatient Medications   Medication Sig Dispense Refill    ibuprofen (ADVIL;MOTRIN) 800 MG tablet Take 1 tablet by mouth every 8 hours as needed for Pain 30 tablet 0     Allergies:  Patient has no known allergies. Social History:    Social History     Socioeconomic History    Marital status: Single     Spouse name: Not on file    Number of children: Not on file    Years of education: Not on file    Highest education level: Not on file   Occupational History    Not on file   Social Needs    Financial resource strain: Not on file    Food insecurity     Worry: Not on file     Inability: Not on file    Transportation needs     Medical: Not on file     Non-medical: Not on file   Tobacco Use    Smoking status: Never Smoker    Smokeless tobacco: Never Used   Substance and Sexual Activity    Alcohol use: No    Drug use: No    Sexual activity: Not on file   Lifestyle    Physical activity     Days per week: Not on file     Minutes per session: Not on file    Stress: Not on file   Relationships    Social connections     Talks on phone: Not on file     Gets together: Not on file     Attends [employer] service: Not on file     Active member of club or organization: Not on file     Attends meetings of clubs or organizations: Not on file     Relationship status: Not on file    Intimate partner violence     Fear of current or ex partner: Not on file     Emotionally abused: Not on file     Physically abused: Not on file     Forced sexual activity: Not on file   Other Topics Concern    Not on file   Social History Narrative    Not on file     Family History: Family history of CAD, acutely mor  No family history on file.     · REVIEW OF SYSTEMS   CONSTITUTIONAL: negative  EYES:  negative  HEENT:  negative  RESPIRATORY: negative   CARDIOVASCULAR: negative except for chest pain  GASTROINTESTINAL:  negative  GENITOURINARY:  negative  INTEGUMENT:  negative  HEMATOLOGIC/LYMPHATIC:  negative  ALLERGIC/IMMUNOLOGIC:  negative  ENDOCRINE:  negative  MUSCULOSKELETAL:  negative  NEUROLOGICAL:  negative  BEHAVIOR/PSYCH:  negative    PHYSICAL EXAM:    Vitals:    VITALS:  BP (!) 127/96   Pulse 83   Temp 98.3 °F (36.8 °C)   Resp 8   Wt 202 lb 11.2 oz (91.9 kg)   SpO2 98%   BMI 28.27 kg/m²   24HR INTAKE/OUTPUT:  No intake or output data in the 24 hours ending 04/15/21 1059    CONSTITUTIONAL:  awake, alert, cooperative, no apparent distress, and appears stated age  EYES: Sclera clear, conjunctiva normal  ENT:  normocepalic, without obvious abnormality  NECK:  supple, symmetrical, trachea midline, no carotid bruit, no JVD  BACK:  symmetric  LUNGS: Non-labored, good air exchange, clear to auscultation bilaterally, no crackles or wheezing  CARDIOVASCULAR:  Regular rate and rhythm, normal S1 and S2, no S3 or S4, and no murmur noted, no rub.  dorsalis pedis, posterior tibial, femoral and bilateralpresent 2+  2+ right radial pulse with normal Elliott test on the right  ABDOMEN:  Normal bowel sounds, soft, non-distended, non-tender  MUSCULOSKELETAL:  there is no redness, warmth, or swelling of the joints   No leg edema. NEUROLOGIC:  Awake, alert, oriented to name, place and time.   SKIN:  no bruising or bleeding, normal skin color, texture, turgor and no jaundice    DATA:   ECG:     ECHO: Pending    Cardiology Labs:  Recent Labs     04/15/21  0936   TROPHS 775*   TROPONINT NOT REPORTED     Warfarin PT/INR:  Lab Results   Component Value Date    PROTIME 12.6 04/15/2021    INR 1.0 04/15/2021     CBC:  Lab Results   Component Value Date    WBC 6.5 04/15/2021    RBC 4.23 04/15/2021    HGB 13.6 04/15/2021    HCT 40.2 04/15/2021    MCV 95.0 04/15/2021    MCH 32.2 04/15/2021    MCHC 33.8 04/15/2021    RDW 12.6 04/15/2021     04/15/2021    MPV 11.7 04/15/2021     CMP:  Lab Results   Component Value Date     04/15/2021    K 3.5 04/15/2021    CL 99 04/15/2021    CO2 26 04/15/2021    BUN 9 04/15/2021    CREATININE 0.73 04/15/2021    GFRAA >60 04/15/2021    LABGLOM >60 04/15/2021    GLUCOSE 129 04/15/2021    CALCIUM 8.9 04/15/2021     Magnesium:    Lab Results   Component Value Date    MG 2.1 04/15/2021     PTT:    Lab Results   Component Value Date    APTT 37.7 04/15/2021     TSH:  No results found for: TSH  BNP: No results for input(s): BNP, PROBNP in the last 72 hours. BMP:  Lab Results   Component Value Date     04/15/2021    K 3.5 04/15/2021    CL 99 04/15/2021    CO2 26 04/15/2021    BUN 9 04/15/2021    LABALBU 4.1 04/15/2021    CREATININE 0.73 04/15/2021    CALCIUM 8.9 04/15/2021    GFRAA >60 04/15/2021    LABGLOM >60 04/15/2021    GLUCOSE 129 04/15/2021     LIVER PROFILE:  Recent Labs     04/15/21  0936   AST 48*   ALT 28   LABALBU 4.1   ALKPHOS 79   BILITOT 0.23*   BILIDIR 0.08   IBILI 0.15   PROT 6.6   GLOB NOT REPORTED   ALBUMIN NOT REPORTED     FLP:  No results found for: CHOL, TRIG, HDL, LDLCHOLESTEROL     D-dimer 0.34    IMPRESSION    · Non-STEMI, no further chest pain  · No prior medical history  · Smoking  · Multiple family members have CAD and reportedly have  in her sleep from [address] at younger ages per his sister who is a nurse    RECOMMENDATIONS:     Continue current cardiac medications. Continue heparin drip add metoprolol 25 mg oral twice daily. I discussed with the patient and also with his sister over the phone who is a nurse the options of treatment for his non-STEMI of conservative versus invasive with a cardiac catheterization. Indications, risk, and benefits were discussed with them and the patient thinks he is agreeable but would like to talk to his mother prior to proceeding. Leave patient n.p.o. for now until he makes his decision regarding catheterization.  Management plan was discussed with patient, his sister over the phone, and

## 2021-04-15 NOTE — H&P
[facility]  Office: [address], DO, Lilia Shi, DO, Kristy Chan, DO, Teri Beltran Blood, DO, Keegan Browne MD, Sofie Herrera MD, Iam Farmer MD, Kaushik Mancera MD, Cher Carlos MD, Yony Chiu MD, Garrett Suazo MD, Digna Sauer MD, Promise Velasquez DO, Nikole Delgado MD, Yeison Spears DO, Megan Welch MD,  Autumn Garcia DO, Courtney Mcnamara MD, Oneal Mancuso MD, Mykel Mendez MD, Barrera Rivero MD, Aniceto Los, [facility], [address], [facility], Ruby Sierra, CNP, Akil Durand, CNS, Gordon Louise, CNP, Thomas Ramos, CNP, Robert Dempsey, CNP, Norman Nava, CNP, Kanu Shaw, CNP, Cris Van PA-C, Shefali Soto, [facility], Lukas Tapia, CNP, Keon Blackwood, CNP, Sam Ariza, CNP, Jose White, CNP, Maria De Jesus Vergara, CNP, Amrita Kessler, [address]    HISTORY AND PHYSICAL EXAMINATION            Date:   4/15/2021  Patient name:  Jenny Funez  Date of admission:  4/15/2021  9:06 AM  MRN:   [id number]  Account:  [de-identified]  YOB: 1990  PCP:    No primary care provider on file. Room:   [place] [address]  Code Status:    No Order    Chief Complaint:     Chief Complaint   Patient presents with    Chest Pain     epigastric    Abdominal Pain       History Obtained From:     patient    History of Present Illness:     Jenny Funez is a 31 y.o. Unavailable/unknown male who presents with Chest Pain (epigastric) and Abdominal Pain   and is admitted to the hospital for the management of NSTEMI (non-ST elevated myocardial infarction) ([facility] [address] Patient reports to the emergency department with a 36 to 48-hour history of midsternal chest heaviness. Patient reports that the symptoms came on unprovoked and more persistent. He reports there alleviated by nothing aggravated by nothing.   Patient also was suffering from general fatigue yesterday and he left work early and rested in an attempt to relieve his symptoms. After symptoms did not resolve he decided to seek emergency department evaluation today. Emergency department evaluation reveals no twelve-lead abnormalities and no PE. Patient's troponin however was found to be markedly increased at greater than 700. Patient has no personal history, patient denies any family history, patients only risk factor is obesity. Patient adamantly denies any recreational drug use and denies stimulants of any kind. Further interview with the patient does reveal that he has had a chronic tooth cavity with pain and occasional inflammation for the past 7 months. Case is discussed at length with cardiology and they report intention to take the patient to the Cath Lab later this afternoon. Internal medicine has requested stat cardiac echo as well. Past Medical History:     No past medical history on file. Past Surgical History:     No past surgical history on file. Medications Prior to Admission:     Prior to Admission medications    Medication Sig Start Date End Date Taking? Authorizing Provider   ibuprofen (ADVIL;MOTRIN) 800 MG tablet Take 1 tablet by mouth every 8 hours as needed for Pain 2/14/17   Alessandro Alaniz PA-C        Allergies:     Patient has no known allergies. Social History:     Tobacco:    reports that he has never smoked. He has never used smokeless tobacco.  Alcohol:      reports no history of alcohol use. Drug Use:  reports no history of drug use. Family History:     Family History   Family history unknown: Yes    -Patient states family has no chronic medical conditions that he is aware of. Review of Systems:     Positive and Negative as described in HPI. Review of Systems   Constitutional: Positive for fatigue. Negative for fever and unexpected weight change. HENT: Negative for congestion, ear discharge, sinus pressure and sinus pain. Eyes: Negative for photophobia and visual disturbance.    Respiratory: Negative for shortness of breath and wheezing. Cardiovascular: Positive for chest pain. Negative for palpitations and leg swelling. Gastrointestinal: Negative for abdominal distention and abdominal pain. Endocrine: Negative for cold intolerance and heat intolerance. Genitourinary: Negative for enuresis, frequency and urgency. Musculoskeletal: Negative for arthralgias and myalgias. Skin: Negative for color change and rash. Allergic/Immunologic: Negative for environmental allergies, food allergies and immunocompromised state. Neurological: Negative for tremors, seizures, speech difficulty, weakness, light-headedness and numbness. Hematological: Negative for adenopathy. Does not bruise/bleed easily. Psychiatric/Behavioral: Negative for agitation, confusion and self-injury. The patient is not hyperactive. Physical Exam:   BP (!) 138/93   Pulse 91   Temp 98.3 °F (36.8 °C)   Resp 12   Wt 202 lb 11.2 oz (91.9 kg)   SpO2 98%   BMI 28.27 kg/m²   Temp (24hrs), Av.3 °F (36.8 °C), Min:98.3 °F (36.8 °C), Max:98.3 °F (36.8 °C)    No results for input(s): POCGLU in the last 72 hours. No intake or output data in the 24 hours ending 04/15/21 1204    Physical Exam  Constitutional:       General: He is not in acute distress. Appearance: Normal appearance. He is normal weight. He is not ill-appearing or diaphoretic. HENT:      Head: Normocephalic and atraumatic. Nose: Nose normal. No congestion or rhinorrhea. Mouth/Throat:      Mouth: Mucous membranes are moist. No oral lesions. Dentition: Dental tenderness, dental caries and gum lesions present. Tongue: No lesions. Tongue does not deviate from midline. Palate: No mass. Pharynx: Oropharynx is clear. No pharyngeal swelling or posterior oropharyngeal erythema. Eyes:      Extraocular Movements: Extraocular movements intact. Pupils: Pupils are equal, round, and reactive to light.    Neck:      Musculoskeletal: Normal range of motion and neck supple. No neck rigidity. Cardiovascular:      Rate and Rhythm: Normal rate and regular rhythm. Pulses: Normal pulses. Heart sounds: Normal heart sounds. No murmur. Pulmonary:      Effort: Pulmonary effort is normal. No respiratory distress. Breath sounds: Normal breath sounds. No wheezing. Abdominal:      General: Abdomen is flat. Bowel sounds are normal. There is no distension. Palpations: Abdomen is soft. Tenderness: There is no abdominal tenderness. Musculoskeletal: Normal range of motion. General: No swelling, tenderness or deformity. Skin:     General: Skin is warm and dry. Capillary Refill: Capillary refill takes less than 2 seconds. Coloration: Skin is not jaundiced. Neurological:      General: No focal deficit present. Mental Status: He is alert and oriented to person, place, and time. Cranial Nerves: No cranial nerve deficit. Motor: No weakness.    Psychiatric:         Mood and Affect: Mood normal.         Behavior: Behavior normal.         Investigations:      Laboratory Testing:  Recent Results (from the past 24 hour(s))   EKG 12 Lead    Collection Time: 04/15/21  9:08 AM   Result Value Ref Range    Ventricular Rate 82 BPM    Atrial Rate 82 BPM    P-R Interval 138 ms    QRS Duration 84 ms    Q-T Interval 364 ms    QTc Calculation (Bazett) 425 ms    P Axis 44 degrees    R Axis 16 degrees    T Axis 47 degrees   Basic Metabolic Panel w/ Reflex to MG    Collection Time: 04/15/21  9:36 AM   Result Value Ref Range    Glucose 129 (H) 70 - 99 mg/dL    BUN 9 6 - 20 mg/dL    CREATININE 0.73 0.70 - 1.20 mg/dL    Bun/Cre Ratio 12 9 - 20    Calcium 8.9 8.6 - 10.4 mg/dL    Sodium 137 135 - 144 mmol/L    Potassium 3.5 (L) 3.7 - 5.3 mmol/L    Chloride 99 98 - 107 mmol/L    CO2 26 20 - 31 mmol/L    Anion Gap 12 9 - 17 mmol/L    GFR Non-African American >60 >60 mL/min    GFR African American >60 >60 mL/min    GFR Comment GFR Staging NOT REPORTED    CBC Auto Differential    Collection Time: 04/15/21  9:36 AM   Result Value Ref Range    WBC 6.5 3.5 - 11.3 k/uL    RBC 4.23 4.21 - 5.77 m/uL    Hemoglobin 13.6 13.0 - 17.0 g/dL    Hematocrit 40.2 (L) 40.7 - 50.3 %    MCV 95.0 82.6 - 102.9 fL    MCH 32.2 25.2 - 33.5 pg    MCHC 33.8 28.4 - 34.8 g/dL    RDW 12.6 11.8 - 14.4 %    Platelets 783 142 - 328 k/uL    MPV 11.7 8.1 - 13.5 fL    NRBC Automated 0.0 0.0 per 100 WBC    Differential Type NOT REPORTED     WBC Morphology NOT REPORTED     RBC Morphology NOT REPORTED     Platelet Estimate NOT REPORTED     Seg Neutrophils 50 36 - 65 %    Lymphocytes 40 24 - 43 %    Monocytes 9 3 - 12 %    Eosinophils % 0 (L) 1 - 4 %    Basophils 0 0 - 2 %    Immature Granulocytes 0 0 %    Segs Absolute 3.23 1.50 - 8.10 k/uL    Absolute Lymph # 2.59 1.10 - 3.70 k/uL    Absolute Mono # 0.58 0.10 - 1.20 k/uL    Absolute Eos # <0.03 0.00 - 0.44 k/uL    Basophils Absolute <0.03 0.00 - 0.20 k/uL    Absolute Immature Granulocyte 0.02 0.00 - 0.30 k/uL   Troponin    Collection Time: 04/15/21  9:36 AM   Result Value Ref Range    Troponin, High Sensitivity 775 (HH) 0 - 22 ng/L    Troponin T NOT REPORTED <0.03 ng/mL    Troponin Interp NOT REPORTED    D-Dimer, Quantitative    Collection Time: 04/15/21  9:36 AM   Result Value Ref Range    D-Dimer, Quant 0.34 0.00 - 0.59 mg/L FEU   Lipase    Collection Time: 04/15/21  9:36 AM   Result Value Ref Range    Lipase 26 13 - 60 U/L   Amylase    Collection Time: 04/15/21  9:36 AM   Result Value Ref Range    Amylase 60 28 - 100 U/L   Hepatic Function Panel    Collection Time: 04/15/21  9:36 AM   Result Value Ref Range    Albumin 4.1 3.5 - 5.2 g/dL    Alkaline Phosphatase 67 40 - 129 U/L    ALT 28 5 - 41 U/L    AST 48 (H) <40 U/L    Total Bilirubin 0.23 (L) 0.3 - 1.2 mg/dL    Bilirubin, Direct 0.08 <0.31 mg/dL    Bilirubin, Indirect 0.15 0.00 - 1.00 mg/dL    Total Protein 6.6 6.4 - 8.3 g/dL    Globulin NOT REPORTED 1.5 - 3.8 g/dL Albumin/Globulin Ratio NOT REPORTED 1.0 - 2.5   Magnesium    Collection Time: 04/15/21  9:36 AM   Result Value Ref Range    Magnesium 2.1 1.6 - 2.6 mg/dL   Protime-INR    Collection Time: 04/15/21  9:36 AM   Result Value Ref Range    Protime 12.6 11.5 - 14.2 sec    INR 1.0    APTT    Collection Time: 04/15/21  9:36 AM   Result Value Ref Range    PTT 37.7 (H) 23.9 - 33.8 sec   Sedimentation Rate    Collection Time: 04/15/21  9:36 AM   Result Value Ref Range    Sed Rate 27 (H) 0 - 15 mm   EKG 12 Lead    Collection Time: 04/15/21 10:04 AM   Result Value Ref Range    Ventricular Rate 89 BPM    Atrial Rate 89 BPM    P-R Interval 140 ms    QRS Duration 84 ms    Q-T Interval 368 ms    QTc Calculation (Bazett) 447 ms    P Axis 45 degrees    R Axis -8 degrees    T Axis 28 degrees   URINE DRUG SCREEN    Collection Time: 04/15/21 10:50 AM   Result Value Ref Range    Amphetamine Screen, Ur NEGATIVE NEGATIVE    Barbiturate Screen, Ur NEGATIVE NEGATIVE    Benzodiazepine Screen, Urine NEGATIVE NEGATIVE    Cocaine Metabolite, Urine NEGATIVE NEGATIVE    Methadone Screen, Urine NEGATIVE NEGATIVE    Opiates, Urine NEGATIVE NEGATIVE    Phencyclidine, Urine NEGATIVE NEGATIVE    Propoxyphene, Urine NOT REPORTED NEGATIVE    Cannabinoid Scrn, Ur NEGATIVE NEGATIVE    Oxycodone Screen, Ur NEGATIVE NEGATIVE    Methamphetamine, Urine NOT REPORTED NEGATIVE    Tricyclic Antidepressants, Urine NOT REPORTED NEGATIVE    MDMA, Urine NOT REPORTED NEGATIVE    Buprenorphine Urine NOT REPORTED NEGATIVE    Test Information       Assay provides medical screening only. The absence of expected drug(s) and/or metabolite(s) may indicate diluted or adulterated urine, limitations of testing or timing of collection.        Imaging/Diagnostics:  Xr Chest Portable    Result Date: 4/15/2021  Negative portable chest exam       Assessment :      Hospital Problems           Last Modified POA    * (Principal) NSTEMI (non-ST elevated myocardial infarction) ([facility] [address] 4/15/2021 Yes          Plan:     Patient status inpatient in the  Progressive Unit/Step down    1. Non-STEMI  1. Heparin drip now  2. Daily aspirin, beta-blocker, ACE  3. Cardiology consultation, reportedly scheduled for heart catheterization later this afternoon  4. Stat echo now  2. Poor dentition  1. Stat cardiac echo  2. Check lactic, add blood culture, add procalcitonin  3. Initiate clindamycin  4. Recommend outpatient follow-up with dentist for likely tooth extraction  3. Obesity  1. Diet lifestyle modification education      Consultations:   IP CONSULT TO CARDIOLOGY  IP CONSULT TO INTERNAL MEDICINE  IP CONSULT TO CARDIOLOGY    Patient is admitted as inpatient status because of co-morbidities listed above, severity of signs and symptoms as outlined, requirement for current medical therapies and most importantly because of direct risk to patient if care not provided in a hospital setting. Expected length of stay > 48 hours. SALLY Chan NP  4/15/2021  12:04 PM    Copy sent to Dr. Deepa Rothman primary care provider on file.

## 2021-04-16 VITALS
BODY MASS INDEX: 28.38 KG/M2 | RESPIRATION RATE: 16 BRPM | HEIGHT: 71 IN | SYSTOLIC BLOOD PRESSURE: 127 MMHG | WEIGHT: 202.7 LBS | OXYGEN SATURATION: 98 % | TEMPERATURE: 97.4 F | DIASTOLIC BLOOD PRESSURE: 72 MMHG | HEART RATE: 80 BPM

## 2021-04-16 LAB
ALBUMIN SERPL-MCNC: 3.7 G/DL (ref 3.5–5.2)
ALBUMIN/GLOBULIN RATIO: ABNORMAL (ref 1–2.5)
ALP BLD-CCNC: 60 U/L (ref 40–129)
ALT SERPL-CCNC: 29 U/L (ref 5–41)
ANION GAP SERPL CALCULATED.3IONS-SCNC: 11 MMOL/L (ref 9–17)
ANTI-NUCLEAR ANTIBODY (ANA): NEGATIVE
AST SERPL-CCNC: 63 U/L
BILIRUB SERPL-MCNC: 0.23 MG/DL (ref 0.3–1.2)
BNP INTERPRETATION: NORMAL
BUN BLDV-MCNC: 7 MG/DL (ref 6–20)
BUN/CREAT BLD: 9 (ref 9–20)
CALCIUM SERPL-MCNC: 9.1 MG/DL (ref 8.6–10.4)
CHLORIDE BLD-SCNC: 104 MMOL/L (ref 98–107)
CHOLESTEROL/HDL RATIO: 4.9
CHOLESTEROL: 152 MG/DL
CO2: 22 MMOL/L (ref 20–31)
CREAT SERPL-MCNC: 0.79 MG/DL (ref 0.7–1.2)
CYTOMEGALOVIRUS IGG ANTIBODY: 20.8
EBV EARLY ANTIGEN IGG: 25 U/ML
EBV INTERPRETATION: ABNORMAL
EBV NUCLEAR AG AB: 536 U/ML
EPSTEIN-BARR VCA IGG: 1139 U/ML
EPSTEIN-BARR VCA IGM: 11 U/ML
GFR AFRICAN AMERICAN: >60 ML/MIN
GFR NON-AFRICAN AMERICAN: >60 ML/MIN
GFR SERPL CREATININE-BSD FRML MDRD: ABNORMAL ML/MIN/{1.73_M2}
GFR SERPL CREATININE-BSD FRML MDRD: ABNORMAL ML/MIN/{1.73_M2}
GLUCOSE BLD-MCNC: 120 MG/DL (ref 70–99)
HAV IGM SER IA-ACNC: NONREACTIVE
HCT VFR BLD CALC: 40.7 % (ref 40.7–50.3)
HDLC SERPL-MCNC: 31 MG/DL
HEMOGLOBIN: 13.1 G/DL (ref 13–17)
HEPATITIS B CORE IGM ANTIBODY: NONREACTIVE
HEPATITIS B SURFACE ANTIGEN: NONREACTIVE
HEPATITIS C ANTIBODY: NONREACTIVE
LDL CHOLESTEROL: 82 MG/DL (ref 0–130)
MAGNESIUM: 2.3 MG/DL (ref 1.6–2.6)
MCH RBC QN AUTO: 32.1 PG (ref 25.2–33.5)
MCHC RBC AUTO-ENTMCNC: 32.2 G/DL (ref 28.4–34.8)
MCV RBC AUTO: 99.8 FL (ref 82.6–102.9)
NRBC AUTOMATED: 0 PER 100 WBC
PDW BLD-RTO: 12.9 % (ref 11.8–14.4)
PLATELET # BLD: 150 K/UL (ref 138–453)
PMV BLD AUTO: 11.7 FL (ref 8.1–13.5)
POTASSIUM SERPL-SCNC: 4.2 MMOL/L (ref 3.7–5.3)
PRO-BNP: 272 PG/ML
RBC # BLD: 4.08 M/UL (ref 4.21–5.77)
SARS-COV-2: NORMAL
SARS-COV-2: NOT DETECTED
SODIUM BLD-SCNC: 137 MMOL/L (ref 135–144)
SOURCE: NORMAL
TOTAL PROTEIN: 6.5 G/DL (ref 6.4–8.3)
TRIGL SERPL-MCNC: 194 MG/DL
TROPONIN INTERP: ABNORMAL
TROPONIN T: ABNORMAL NG/ML
TROPONIN, HIGH SENSITIVITY: 1070 NG/L (ref 0–22)
VLDLC SERPL CALC-MCNC: ABNORMAL MG/DL (ref 1–30)
WBC # BLD: 6 K/UL (ref 3.5–11.3)

## 2021-04-16 PROCEDURE — 6370000000 HC RX 637 (ALT 250 FOR IP): Performed by: NURSE PRACTITIONER

## 2021-04-16 PROCEDURE — 83735 ASSAY OF MAGNESIUM: CPT

## 2021-04-16 PROCEDURE — APPSS45 APP SPLIT SHARED TIME 31-45 MINUTES: Performed by: NURSE PRACTITIONER

## 2021-04-16 PROCEDURE — 2580000003 HC RX 258: Performed by: INTERNAL MEDICINE

## 2021-04-16 PROCEDURE — APPNB60 APP NON BILLABLE TIME 46-60 MINS: Performed by: NURSE PRACTITIONER

## 2021-04-16 PROCEDURE — 80053 COMPREHEN METABOLIC PANEL: CPT

## 2021-04-16 PROCEDURE — 36415 COLL VENOUS BLD VENIPUNCTURE: CPT

## 2021-04-16 PROCEDURE — 85027 COMPLETE CBC AUTOMATED: CPT

## 2021-04-16 PROCEDURE — 80061 LIPID PANEL: CPT

## 2021-04-16 PROCEDURE — 83880 ASSAY OF NATRIURETIC PEPTIDE: CPT

## 2021-04-16 PROCEDURE — 84484 ASSAY OF TROPONIN QUANT: CPT

## 2021-04-16 PROCEDURE — 99232 SBSQ HOSP IP/OBS MODERATE 35: CPT | Performed by: INTERNAL MEDICINE

## 2021-04-16 RX ORDER — CLINDAMYCIN HYDROCHLORIDE 300 MG/1
300 CAPSULE ORAL 3 TIMES DAILY
Qty: 30 CAPSULE | Refills: 0 | Status: SHIPPED | OUTPATIENT
Start: 2021-04-16 | End: 2021-04-26

## 2021-04-16 RX ORDER — CALCIUM CARBONATE 200(500)MG
500 TABLET,CHEWABLE ORAL 3 TIMES DAILY PRN
Status: DISCONTINUED | OUTPATIENT
Start: 2021-04-16 | End: 2021-04-16 | Stop reason: HOSPADM

## 2021-04-16 RX ADMIN — ANTACID TABLETS 500 MG: 500 TABLET, CHEWABLE ORAL at 01:59

## 2021-04-16 RX ADMIN — LISINOPRIL 5 MG: 5 TABLET ORAL at 08:59

## 2021-04-16 RX ADMIN — FAMOTIDINE 20 MG: 20 TABLET ORAL at 09:00

## 2021-04-16 RX ADMIN — CLINDAMYCIN HYDROCHLORIDE 300 MG: 150 CAPSULE ORAL at 06:05

## 2021-04-16 RX ADMIN — CLINDAMYCIN HYDROCHLORIDE 300 MG: 150 CAPSULE ORAL at 00:40

## 2021-04-16 RX ADMIN — METOPROLOL TARTRATE 25 MG: 25 TABLET, FILM COATED ORAL at 09:00

## 2021-04-16 RX ADMIN — SODIUM CHLORIDE, PRESERVATIVE FREE 10 ML: 5 INJECTION INTRAVENOUS at 09:00

## 2021-04-16 RX ADMIN — CLINDAMYCIN HYDROCHLORIDE 300 MG: 150 CAPSULE ORAL at 12:48

## 2021-04-16 ASSESSMENT — PAIN SCALES - GENERAL
PAINLEVEL_OUTOF10: 0

## 2021-04-16 NOTE — DISCHARGE SUMMARY
[facility]  Office: [address], DO, Brandon Hawthorne, DO, Richard Quiñonez, DO, Presley Mendoza, DO, Frannie Willams MD, Giovanny Montoya MD, Stephanie Ayala MD, Armaan Morocho MD, Aiyana Stanley MD, Michael Ruiz MD, Ronaldo Gallardo MD, Kevon Joseph MD, Kristyn Hernandez, DO, Marie Roy MD, Bib Mabry DO, Carmelo Vilchis MD,  Sandra Sotelo DO, Shahida Weinstein MD, Tomasz Ewing MD, Rupal Torres MD, Linda Alejandre MD, Ana Rosa nA, [facility], [facility], CNP, Denis Fernandez, CNP, Ariel De La Cruz, CNS, Bertin Duenas, CNP, Thompson Lombard, CNP, Renetta Diane, CNP, Chioma Knapp, CNP, Cyndi Cameron, CNP, Owen Milian PA-C, Aiden Jimenez, [facility], Janice Patton, CNP, Ovi Michaud, CNP, Quoc Rajesh, CNP, Jeff Loredo, CNP, Hunter Jackson, CNP, Keshia Geiger, [facility]    Discharge Summary     Patient ID: Elsa Arciniega  :  1990   MRN: [id number]     ACCOUNT:  [de-identified]   Patient's PCP: No primary care provider on file. Admit Date: 4/15/2021   Discharge Date: 2021     Length of Stay: 1  Code Status:  Full Code  Admitting Physician: Mony Shaw DO  Discharge Physician: Mo Garrett APRN - NP     Active Discharge Diagnoses:     Hospital Problem Lists:  Principal Problem:    NSTEMI (non-ST elevated myocardial infarction) [facility])  Active Problems:    Acute myocarditis  Resolved Problems:    * No resolved hospital problems. *      Admission Condition:  fair     Discharged Condition: good    Hospital Stay:     Hospital Course:  Elsa Arciniega is a 31 y.o. male who was admitted for the management of  NSTEMI (non-ST elevated myocardial infarction) [facility]) , presented to ER with Chest Pain (epigastric) and Abdominal Pain    4/15 - Patient reports to the emergency department with a 36 to 48-hour history of midsternal chest heaviness. Patient reports that the symptoms came on unprovoked and more persistent.   He NEGATIVE 07/28/2019        Radiology:  Xr Chest Portable    Result Date: 4/15/2021  Negative portable chest exam       Consultations:    Consults:     Final Specialist Recommendations/Findings:   IP CONSULT TO CARDIOLOGY  IP CONSULT TO INTERNAL MEDICINE  IP CONSULT TO CARDIOLOGY      The patient was seen and examined on day of discharge and this discharge summary is in conjunction with any daily progress note from day of discharge. Discharge plan:     Disposition: Home    Physician Follow Up: Mirta Roque MD  . [address] [facility]  [phone]    In 1 month         Requiring Further Evaluation/Follow Up POST HOSPITALIZATION/Incidental Findings: Myocarditis, poor dentition    Diet: cardiac diet    Activity: As tolerated    Instructions to Patient: Take the antibiotics for your tooth and see a dentist as soon as your able. See the cardiologist in 1 month for follow-up. Have a cardiac echo in 3 months. Discharge Medications:      Medication List      START taking these medications    clindamycin 300 MG capsule  Commonly known as: CLEOCIN  Take 1 capsule by mouth 3 times daily for 10 days        STOP taking these medications    ibuprofen 800 MG tablet  Commonly known as: ADVIL;MOTRIN           Where to Get Your Medications      These medications were sent to [address] [place] [id number]    Phone: [phone]   · clindamycin 300 MG capsule         No discharge procedures on file. Time Spent on discharge is  43 mins in patient examination, evaluation, counseling as well as medication reconciliation, prescriptions for required medications, discharge plan and follow up. Electronically signed by   SALLY Champion NP  4/16/2021  11:20 AM      Thank you Dr. Olguin primary care provider on file. for the opportunity to be involved in this patient's care.

## 2021-04-16 NOTE — DISCHARGE INSTR - COC
Continuity of Care Form    Patient Name: Chandan Bettencourt   :  1990  MRN:  [id number]    Admit date:  4/15/2021  Discharge date:  ***    Code Status Order: Full Code   Advance Directives:     Admitting Physician:  Zully De Luna DO  PCP: No primary care provider on file. Discharging Nurse: [facility] Unit/Room#: 0/-01  Discharging Unit Phone Number: ***    Emergency Contact:   Extended Emergency Contact Information  Primary Emergency Contact: Corbin Guardian  Address: [place] [address], [address]  Home Phone: [phone]  Relation: Parent  Secondary Emergency Contact: Dave Alberto Sr  Address: [place] [address], [address]  Home Phone: [phone]  Relation: Parent    Past Surgical History:  No past surgical history on file. Immunization History: There is no immunization history on file for this patient.     Active Problems:  Patient Active Problem List   Diagnosis Code    NSTEMI (non-ST elevated myocardial infarction) ([facility] [address] I21.4    Acute myocarditis I40.9       Isolation/Infection:   Isolation          No Isolation        Patient Infection Status     None to display          Nurse Assessment:  Last Vital Signs: /80   Pulse 88   Temp 97.7 °F (36.5 °C) (Temporal)   Resp 16   Ht 5' 10.98\" (1.803 m)   Wt 202 lb 11.2 oz (91.9 kg)   SpO2 98%   BMI 28.28 kg/m²     Last documented pain score (0-10 scale): Pain Level: 0  Last Weight:   Wt Readings from Last 1 Encounters:   04/15/21 202 lb 11.2 oz (91.9 kg)     Mental Status:  {IP PT MENTAL STATUS:}    IV Access:  { BHARAT IV ACCESS:425167021}    Nursing Mobility/ADLs:  Walking   {P DME KXDR:333565142}  Transfer  {P DME RCEQ:004552122}  Bathing  {P DME AXTQ:933655387}  Dressing  {P DME EVWP:015963134}  Toileting  {P DME BTPP:272197622}  Feeding  {[facility] DME CZYA:810499831}  Med Admin  {P DME JEJC:427542906}  Med Delivery   {[facility] MED Delivery:207367424}    Wound Care Documentation and SECTION    Prognosis: {Prognosis:5542946078}    Condition at Discharge: Angela Laguna Patient Condition:845357122}    Rehab Potential (if transferring to Rehab): {Prognosis:3095678561}    Recommended Labs or Other Treatments After Discharge: ***    Physician Certification: I certify the above information and transfer of Dat Griffin  is necessary for the continuing treatment of the diagnosis listed and that he requires {Admit to Appropriate Level of Care:03670} for {GREATER/LESS:259785399} 30 days.      Update Admission H&P: [facility]400[facility]}    PHYSICIAN SIGNATURE:  {Esignature:824769919}

## 2021-04-16 NOTE — CARE COORDINATION
preserved LV systolic function   with EF of 55%   No MR or AS   Patient most likely has localized myocarditis affecting he apex and expect   it to resolve.      Recommendations      Medical treatment.   Monitor LVF by echo in 2-3 months      Covid pending.        Pt does drink Red Bull about 2 per day-S.O. claims he drinks more daily    Electronically signed by Brian Rosario RN on 4/16/21 at 10:00 AM EDT

## 2021-04-16 NOTE — PROGRESS NOTES
Covid 19 swab taken from left nare, labeled, placed in red dot bag, and handed off to second healthcare worker outside of room for transport to laboratory per hospital policy and procedure. Patient tolerated procedure very well.

## 2021-04-16 NOTE — PROGRESS NOTES
Pt received discharge information regarding follow-up, medication, self-care post cardiac cath, general instructions regarding dental care. Pt voiced understanding of same. Return to work slip provided to patient. Pt discharged with all belongings, ambulatory with significant other.

## 2021-04-16 NOTE — PROGRESS NOTES
Section of Cardiology  Progress Note      Date:  4/16/2021  Patient: Joel Sawyer  Admission:  4/15/2021  9:06 AM  Admit DX: NSTEMI (non-ST elevated myocardial infarction) [facility]) [I21.4]  Age:  31 y.o., 1990     LOS: 1 day     Reason for evaluation:   Chest pain, post heart catheterization      SUBJECTIVE:     The patient was seen and examined. Notes and labs reviewed. There were not complications over night. Patient's cardiac review of systems: negative for chest pain, dyspnea, fatigue, palpitations and syncope. The patient is generally feeling stable. OBJECTIVE:      EXAM:   Vitals:    VITALS:  /80   Pulse 88   Temp 97.7 °F (36.5 °C) (Temporal)   Resp 16   Ht 5' 10.98\" (1.803 m)   Wt 202 lb 11.2 oz (91.9 kg)   SpO2 98%   BMI 28.28 kg/m²   24HR INTAKE/OUTPUT:      Intake/Output Summary (Last 24 hours) at 4/16/2021 0934  Last data filed at 4/16/2021 2731  Gross per 24 hour   Intake 920 ml   Output 1300 ml   Net -380 ml       CONSTITUTIONAL:  awake, alert, cooperative, no apparent distress, and appears stated age. HEENT: Normal jugular venous pulsations, no carotid bruits. Head is atraumatic, normocephalic. Eyes and oral mucosa are normal.  LUNGS: Good respiratory effort On auscultation: clear to auscultation bilaterally  CARDIOVASCULAR:  Normal apical impulse, regular rate and rhythm, normal S1 and S2, no S3 or S4, and no murmur or rub noted. dorsalis pedis, posterior tibial, radial and bilateralpresent 2+  SKIN: Warm and dry. EXTREMITIES:No lower extremity edema. Motor movement grossly intact. No cyanosis or clubbing.     Current Inpatient Medications:   famotidine  20 mg Oral BID    metoprolol tartrate  25 mg Oral BID    atorvastatin  40 mg Oral Nightly    lisinopril  5 mg Oral Daily    clindamycin  300 mg Oral 4 times per day    sodium chloride flush  5-40 mL Intravenous 2 times per day       IV Infusions (if any):   sodium chloride      sodium chloride Diagnostics:   Telemetry: Sinus rhythm with occasional premature ventricular complexes  Transradial cardiac catheterization done on April 15, 2021:   Post trans radial LHC, COR, LV   Normal coronary arteries   Mild to moderate apical hypokinesis and preserved LV systolic function   with EF of 55%   No MR or AS   Patient most likely has localized myocarditis affecting he apex and expect   it to resolve. Recommendations      Medical treatment.    Monitor LVF by echo in 2-3 months      Signature      ----------------------------------------------------------------   Electronically signed by Shital Gupta(Performing Physician) on   04/15/2021 16:12  Labs:   CBC:  Recent Labs     04/15/21  0936 04/16/21  0517   WBC 6.5 6.0   HGB 13.6 13.1   HCT 40.2* 40.7    150     Magnesium:  Recent Labs     04/15/21  0936 04/16/21 0517   MG 2.1 2.3     BMP:  Recent Labs     04/15/21  0936 04/16/21  0517    137   K 3.5* 4.2   CALCIUM 8.9 9.1   CO2 26 22   BUN 9 7   CREATININE 0.73 0.79   LABGLOM >60 >60   GLUCOSE 129* 120*     BNP:  Recent Labs     04/16/21  0517   PROBNP 272     PT/INR:  Recent Labs     04/15/21  0936   PROTIME 12.6   INR 1.0     APTT:  Recent Labs     04/15/21  0936   APTT 37.7*     CARDIAC ENZYMES:  Recent Labs     04/15/21  1648 04/15/21  2019 04/16/21  0517   TROPHS 970* 995* 1,070*   TROPONINT NOT REPORTED NOT REPORTED NOT REPORTED     FASTING LIPID PANEL:  Lab Results   Component Value Date    HDL 31 04/16/2021    TRIG 194 04/16/2021     LIVER PROFILE:  Recent Labs     04/15/21  0936 04/16/21  0517   AST 48* 63*   ALT 28 29   LABALBU 4.1 3.7   ALKPHOS 67 60   BILITOT 0.23* 0.23*   BILIDIR 0.08  --    IBILI 0.15  --    PROT 6.6 6.5   GLOB NOT REPORTED  --    ALBUMIN NOT REPORTED NOT REPORTED        ASSESSMENT:  · Non-STEMI, no further chest pain, this was based on troponin rise and chest pain however transradial catheterization on 4/15/2021 revealed normal coronary arteries  · Moderate apical hypokinesis seen on catheterization as well as echocardiogram with global ejection fraction of about 55%. I suspect that this patient may have had some viral illness and focal myocarditis affecting the apex and I expect that to improve with time. · Smoking  · Multiple family members have CAD and reportedly have  in her sleep from [address] at younger ages per his sister who is a nurse    Patient Active Problem List   Diagnosis    NSTEMI (non-ST elevated myocardial infarction) ([facility] [address]    Acute myocarditis       PLAN:  1. Continue current medications. 2. Smoking cessation  3. Conservative management for the localized wall motion abnormality in the apex. This will be monitored clinically as well as with a follow-up echocardiogram that will be done in about 2 to 3 months and I expect normalization of the contractility of the apex. No need to prescribe medications. Avoidance of alcohol was recommended. No objection for discharge from cardiac standpoint and follow-up in the office in 1 to 5 weeks. Discussed with patient  and nursing.     Carlos Corbett MD

## 2021-04-16 NOTE — PROGRESS NOTES
[facility]  Office: [address], DO, Love Favre, DO, Grayland Runner, DO, Janett Eye Blood, DO, Moreno Tamayo MD, Filomena Luna MD, Frank Del Real MD, Ora Marie MD, Gifty Weinstein MD, Clarita Johnson MD, Majo Nichole MD, Waleska Vazquez MD, Gracie Mayo, DO, Eusebia Muhammad MD, Chloe Mcgowan DO, Murlean Gowers, MD,  Sury Gold DO, Axel Lee MD, Shannon Enriquez MD, Dung Ochoa MD, Nicola Sanchez MD, Riky Rizzo [facility], [facility], CNP, Sahil Rubio CNP, Mona Metcalf, CNS, Rambo Zaman CNP, Francine Murphy, CNP, Carmina Duncan, CNP, Samia Castañeda, [facility], Lorena Avila, CNP, Jey Rodriguez PA-C, Caitlyn Rivera [facility], Casey Julio, CNP, Gatito Valderrama, CNP, Sonja Oconnor, CNP, Kana Andrews, CNP, Juanjo Mcdonnell, CNP, [facility]ricilla [facility]    Progress Note    4/16/2021    11:06 AM    Name:   Ekaterina Núñez  MRN:     [id number]     Acct:      [de-identified]   Room:   2040/204001   Day:  1  Admit Date:  4/15/2021  9:06 AM    PCP:   No primary care provider on file. Code Status:  Full Code    Subjective:     C/C:   Chief Complaint   Patient presents with    Chest Pain     epigastric    Abdominal Pain     Interval History Status: improved. Patient condition is unchanged versus mildly improved. Patient's heart catheterization was completed yesterday as was his cardiac echo. Patient was diagnosed with acute myocarditis per cardiology. They recommend that the patient may be discharged and follow as an outpatient with them in 1 month and have a repeat cardiac echo in 3 months. Case is discussed with the patient and he will be discharged later today. Patient is also informed that he needs to follow-up with a dentist to have his poor dentition managed. Brief History:     4/15 - Patient reports to the emergency department with a 36 to 48-hour history of midsternal chest heaviness.   Patient reports that the symptoms came on unprovoked and more persistent. He reports there alleviated by nothing aggravated by nothing. Patient also was suffering from general fatigue yesterday and he left work early and rested in an attempt to relieve his symptoms. After symptoms did not resolve he decided to seek emergency department evaluation today. 4/16 - Patient condition is unchanged versus mildly improved. Patient's heart catheterization was completed yesterday as was his cardiac echo. Patient was diagnosed with acute myocarditis per cardiology. They recommend that the patient may be discharged and follow as an outpatient with them in 1 month and have a repeat cardiac echo in 3 months. Case is discussed with the patient and he will be discharged later today. Patient is also informed that he needs to follow-up with a dentist to have his poor dentition managed. Review of Systems:     Constitutional:  negative for chills, fevers, sweats  Respiratory:  negative for cough, dyspnea on exertion, shortness of breath, wheezing  Cardiovascular:  negative for chest pain, chest pressure/discomfort, lower extremity edema, palpitations  Gastrointestinal:  negative for abdominal pain, constipation, diarrhea, nausea, vomiting  Neurological:  negative for dizziness, headache    Medications:      Allergies:  No Known Allergies    Current Meds:   Scheduled Meds:    famotidine  20 mg Oral BID    metoprolol tartrate  25 mg Oral BID    atorvastatin  40 mg Oral Nightly    lisinopril  5 mg Oral Daily    clindamycin  300 mg Oral 4 times per day    sodium chloride flush  5-40 mL Intravenous 2 times per day     Continuous Infusions:    sodium chloride      sodium chloride       PRN Meds: calcium carbonate, sodium chloride, potassium chloride **OR** potassium alternative oral replacement **OR** potassium chloride, magnesium sulfate, promethazine **OR** ondansetron, [DISCONTINUED] acetaminophen **OR** acetaminophen, magnesium hydroxide, nitroGLYCERIN, sodium chloride flush, sodium chloride, acetaminophen    Data:     Past Medical History:   has no past medical history on file. Social History:   reports that he has never smoked. He has never used smokeless tobacco. He reports that he does not drink alcohol or use drugs. Family History:   Family History   Family history unknown: Yes       Vitals:  /80   Pulse 88   Temp 97.7 °F (36.5 °C) (Temporal)   Resp 16   Ht 5' 10.98\" (1.803 m)   Wt 202 lb 11.2 oz (91.9 kg)   SpO2 98%   BMI 28.28 kg/m²   Temp (24hrs), Av °F (36.7 °C), Min:97.7 °F (36.5 °C), Max:98.3 °F (36.8 °C)    No results for input(s): POCGLU in the last 72 hours. I/O (24Hr): Intake/Output Summary (Last 24 hours) at 2021 1106  Last data filed at 2021 7777  Gross per 24 hour   Intake 920 ml   Output 1300 ml   Net -380 ml       Labs:  Hematology:  Recent Labs     04/15/21  0936 21  0517   WBC 6.5 6.0   RBC 4.23 4.08*   HGB 13.6 13.1   HCT 40.2* 40.7   MCV 95.0 99.8   MCH 32.2 32.1   MCHC 33.8 32.2   RDW 12.6 12.9    150   MPV 11.7 11.7   SEDRATE 27*  --    CRP 39.2*  --    INR 1.0  --    DDIMER 0.34  --      Chemistry:  Recent Labs     04/15/21  0936 04/15/21  0936 04/15/21  1648 04/15/21  2019 04/16/21  0517     --   --   --  137   K 3.5*  --   --   --  4.2   CL 99  --   --   --  104   CO2 26  --   --   --  22   GLUCOSE 129*  --   --   --  120*   BUN 9  --   --   --  7   CREATININE 0.73  --   --   --  0.79   MG 2.1  --   --   --  2.3   ANIONGAP 12  --   --   --  11   LABGLOM >60  --   --   --  >60   GFRAA >60  --   --   --  >60   CALCIUM 8.9  --   --   --  9.1   PROBNP  --   --   --   --  272   TROPHS 775*   < > 970* 995* 1,070*    < > = values in this interval not displayed.      Recent Labs     04/15/21  0936 21  0517   PROT 6.6 6.5   LABALBU 4.1 3.7   AST 48* 63*   ALT 28 29   ALKPHOS 67 60   BILITOT 0.23* 0.23*   BILIDIR 0.08  --    AMYLASE 60  -- LIPASE 26  --    CHOL  --  152   HDL  --  31*   LDLCHOLESTEROL  --  82   CHOLHDLRATIO  --  4.9   TRIG  --  194*   VLDL  --  NOT REPORTED*     ABG:No results found for: POCPH, PHART, PH, POCPCO2, RBP8VHK, PCO2, POCPO2, PO2ART, PO2, POCHCO3, CXA3XSR, HCO3, NBEA, PBEA, BEART, BE, THGBART, THB, PHJ4SOF, RPQG0ASW, I7ZQTINL, O2SAT, FIO2  Lab Results   Component Value Date/Time    SPECIAL rac 04/15/2021 12:14 PM    SPECIAL rt wrist 04/15/2021 12:14 PM     Lab Results   Component Value Date/Time    CULTURE NO GROWTH 15 HOURS 04/15/2021 12:14 PM    CULTURE NO GROWTH 15 HOURS 04/15/2021 12:14 PM       Radiology:  Xr Chest Portable    Result Date: 4/15/2021  Negative portable chest exam       Physical Examination:        General appearance:  alert, cooperative and no distress  Mental Status:  oriented to person, place and time and normal affect  Lungs:  clear to auscultation bilaterally, normal effort  Heart:  regular rate and rhythm, no murmur  Abdomen:  soft, nontender, nondistended, normal bowel sounds, no masses, hepatomegaly, splenomegaly  Extremities:  no edema, redness, tenderness in the calves  Skin:  no gross lesions, rashes, induration    Assessment:        Hospital Problems           Last Modified POA    * (Principal) NSTEMI (non-ST elevated myocardial infarction) ([facility] [address] 4/15/2021 Yes    Acute myocarditis 4/15/2021 Yes          Plan:        1. NSTEMI with acute myocarditis  1. Cardiology consultation underway, cardiac catheterization completed yesterday  2. Cleared for discharge from cardiology standpoint  3. Presumed viral etiology  4. Follow-up in 1 month with cardiology and complete echo in 3 months  2. Poor dentition with severe dental cavity  1. Continue oral clindamycin  2. Follow-up outpatient with dentist for continued dental care  3.  Discharge today    SALLY Tucker NP  4/16/2021  11:06 AM

## 2021-04-21 LAB
CULTURE: NORMAL
CULTURE: NORMAL
Lab: NORMAL
Lab: NORMAL
SPECIMEN DESCRIPTION: NORMAL
SPECIMEN DESCRIPTION: NORMAL

## 2021-04-25 LAB
COXSACKIE B1 ANTIBODY: NORMAL
COXSACKIE B2 ANTIBODY: NORMAL
COXSACKIE B3 ANTIBODY: NORMAL
COXSACKIE B4 ANTIBODY: NORMAL
COXSACKIE B5 ANTIBODY: NORMAL
COXSACKIE B6 ANTIBODY: NORMAL

## 2022-08-21 ENCOUNTER — APPOINTMENT (OUTPATIENT)
Dept: GENERAL RADIOLOGY | Age: 32
End: 2022-08-21

## 2022-08-21 ENCOUNTER — HOSPITAL ENCOUNTER (EMERGENCY)
Age: 32
Discharge: HOME | End: 2022-08-21
Attending: STUDENT IN AN ORGANIZED HEALTH CARE EDUCATION/TRAINING PROGRAM

## 2022-08-21 VITALS
SYSTOLIC BLOOD PRESSURE: 142 MMHG | TEMPERATURE: 98.6 F | BODY MASS INDEX: 29.26 KG/M2 | WEIGHT: 209 LBS | RESPIRATION RATE: 20 BRPM | OXYGEN SATURATION: 98 % | HEART RATE: 86 BPM | HEIGHT: 71 IN | DIASTOLIC BLOOD PRESSURE: 93 MMHG

## 2022-08-21 DIAGNOSIS — R19.7 NAUSEA VOMITING AND DIARRHEA: Primary | ICD-10-CM

## 2022-08-21 DIAGNOSIS — R11.2 NAUSEA VOMITING AND DIARRHEA: Primary | ICD-10-CM

## 2022-08-21 LAB
ABSOLUTE EOS #: 0.04 K/UL (ref 0–0.44)
ABSOLUTE IMMATURE GRANULOCYTE: 0.02 K/UL (ref 0–0.3)
ABSOLUTE LYMPH #: 4.63 K/UL (ref 1.1–3.7)
ABSOLUTE MONO #: 0.62 K/UL (ref 0.1–1.2)
ALBUMIN SERPL-MCNC: 4.8 G/DL (ref 3.5–5.2)
ALP BLD-CCNC: 68 U/L (ref 40–129)
ALT SERPL-CCNC: 19 U/L (ref 5–41)
ANION GAP SERPL CALCULATED.3IONS-SCNC: 12 MMOL/L (ref 9–17)
AST SERPL-CCNC: 17 U/L
BASOPHILS # BLD: 0 % (ref 0–2)
BASOPHILS ABSOLUTE: 0.03 K/UL (ref 0–0.2)
BILIRUB SERPL-MCNC: 0.25 MG/DL (ref 0.3–1.2)
BUN BLDV-MCNC: 13 MG/DL (ref 6–20)
BUN/CREAT BLD: 15 (ref 9–20)
CALCIUM SERPL-MCNC: 10 MG/DL (ref 8.6–10.4)
CHLORIDE BLD-SCNC: 101 MMOL/L (ref 98–107)
CO2: 25 MMOL/L (ref 20–31)
CREAT SERPL-MCNC: 0.86 MG/DL (ref 0.7–1.2)
EOSINOPHILS RELATIVE PERCENT: 1 % (ref 1–4)
FLU A ANTIGEN: NEGATIVE
FLU B ANTIGEN: NEGATIVE
GFR AFRICAN AMERICAN: >60 ML/MIN
GFR NON-AFRICAN AMERICAN: >60 ML/MIN
GFR SERPL CREATININE-BSD FRML MDRD: ABNORMAL ML/MIN/{1.73_M2}
GLUCOSE BLD-MCNC: 106 MG/DL (ref 70–99)
HCT VFR BLD CALC: 41.5 % (ref 40.7–50.3)
HEMOGLOBIN: 14 G/DL (ref 13–17)
IMMATURE GRANULOCYTES: 0 %
LIPASE: 27 U/L (ref 13–60)
LYMPHOCYTES # BLD: 54 % (ref 24–43)
MCH RBC QN AUTO: 31.1 PG (ref 25.2–33.5)
MCHC RBC AUTO-ENTMCNC: 33.7 G/DL (ref 28.4–34.8)
MCV RBC AUTO: 92.2 FL (ref 82.6–102.9)
MONOCYTES # BLD: 7 % (ref 3–12)
NRBC AUTOMATED: 0 PER 100 WBC
PDW BLD-RTO: 12.7 % (ref 11.8–14.4)
PLATELET # BLD: 167 K/UL (ref 138–453)
PMV BLD AUTO: 12.3 FL (ref 8.1–13.5)
POTASSIUM SERPL-SCNC: 3.8 MMOL/L (ref 3.7–5.3)
PRO-BNP: <20 PG/ML
RBC # BLD: 4.5 M/UL (ref 4.21–5.77)
SARS-COV-2, RAPID: NOT DETECTED
SEG NEUTROPHILS: 38 % (ref 36–65)
SEGMENTED NEUTROPHILS ABSOLUTE COUNT: 3.24 K/UL (ref 1.5–8.1)
SODIUM BLD-SCNC: 138 MMOL/L (ref 135–144)
SPECIMEN DESCRIPTION: NORMAL
TOTAL PROTEIN: 7.5 G/DL (ref 6.4–8.3)
TROPONIN, HIGH SENSITIVITY: 15 NG/L (ref 0–22)
WBC # BLD: 8.6 K/UL (ref 3.5–11.3)

## 2022-08-21 PROCEDURE — 6370000000 HC RX 637 (ALT 250 FOR IP): Performed by: STUDENT IN AN ORGANIZED HEALTH CARE EDUCATION/TRAINING PROGRAM

## 2022-08-21 PROCEDURE — 84484 ASSAY OF TROPONIN QUANT: CPT

## 2022-08-21 PROCEDURE — 99285 EMERGENCY DEPT VISIT HI MDM: CPT

## 2022-08-21 PROCEDURE — 85025 COMPLETE CBC W/AUTO DIFF WBC: CPT

## 2022-08-21 PROCEDURE — 83690 ASSAY OF LIPASE: CPT

## 2022-08-21 PROCEDURE — 80053 COMPREHEN METABOLIC PANEL: CPT

## 2022-08-21 PROCEDURE — 71045 X-RAY EXAM CHEST 1 VIEW: CPT

## 2022-08-21 PROCEDURE — 83880 ASSAY OF NATRIURETIC PEPTIDE: CPT

## 2022-08-21 PROCEDURE — 93005 ELECTROCARDIOGRAM TRACING: CPT | Performed by: STUDENT IN AN ORGANIZED HEALTH CARE EDUCATION/TRAINING PROGRAM

## 2022-08-21 PROCEDURE — 87804 INFLUENZA ASSAY W/OPTIC: CPT

## 2022-08-21 PROCEDURE — 96375 TX/PRO/DX INJ NEW DRUG ADDON: CPT

## 2022-08-21 PROCEDURE — 87635 SARS-COV-2 COVID-19 AMP PRB: CPT

## 2022-08-21 PROCEDURE — 6360000002 HC RX W HCPCS: Performed by: STUDENT IN AN ORGANIZED HEALTH CARE EDUCATION/TRAINING PROGRAM

## 2022-08-21 PROCEDURE — 96374 THER/PROPH/DIAG INJ IV PUSH: CPT

## 2022-08-21 RX ORDER — DICYCLOMINE HYDROCHLORIDE 10 MG/1
20 CAPSULE ORAL ONCE
Status: COMPLETED | OUTPATIENT
Start: 2022-08-21 | End: 2022-08-21

## 2022-08-21 RX ORDER — ONDANSETRON 4 MG/1
4 TABLET, ORALLY DISINTEGRATING ORAL EVERY 8 HOURS PRN
Qty: 12 TABLET | Refills: 0 | Status: SHIPPED | OUTPATIENT
Start: 2022-08-21

## 2022-08-21 RX ORDER — KETOROLAC TROMETHAMINE 15 MG/ML
15 INJECTION, SOLUTION INTRAMUSCULAR; INTRAVENOUS ONCE
Status: COMPLETED | OUTPATIENT
Start: 2022-08-21 | End: 2022-08-21

## 2022-08-21 RX ORDER — DICYCLOMINE HYDROCHLORIDE 10 MG/1
10 CAPSULE ORAL EVERY 6 HOURS PRN
Qty: 20 CAPSULE | Refills: 0 | Status: SHIPPED | OUTPATIENT
Start: 2022-08-21

## 2022-08-21 RX ORDER — ONDANSETRON 2 MG/ML
4 INJECTION INTRAMUSCULAR; INTRAVENOUS ONCE
Status: COMPLETED | OUTPATIENT
Start: 2022-08-21 | End: 2022-08-21

## 2022-08-21 RX ADMIN — KETOROLAC TROMETHAMINE 15 MG: 15 INJECTION, SOLUTION INTRAMUSCULAR; INTRAVENOUS at 21:05

## 2022-08-21 RX ADMIN — ONDANSETRON 4 MG: 2 INJECTION INTRAMUSCULAR; INTRAVENOUS at 20:29

## 2022-08-21 RX ADMIN — DICYCLOMINE HYDROCHLORIDE 20 MG: 10 CAPSULE ORAL at 21:06

## 2022-08-21 ASSESSMENT — ENCOUNTER SYMPTOMS
DIARRHEA: 1
NAUSEA: 1
EYE REDNESS: 0
VOMITING: 1
EYE DISCHARGE: 0
ABDOMINAL PAIN: 1
SHORTNESS OF BREATH: 0
COLOR CHANGE: 0

## 2022-08-21 ASSESSMENT — PAIN SCALES - GENERAL: PAINLEVEL_OUTOF10: 6

## 2022-08-22 NOTE — DISCHARGE INSTRUCTIONS
Call today or tomorrow to follow up with No primary care provider on file. in 7 days if needed    Take your medication as indicated. Do not drink any alcohol. Avoid spicy foods and dairy products. Avoid drinking carbonated beverages (especially any of the dark beverages like coke or pepsi). Return to the Emergency Department for worsening of symptoms, excessive nausea or vomiting, throwing up blood, black stools, any other care or concern.

## 2022-08-22 NOTE — ED PROVIDER NOTES
[facility]  Emergency Department Encounter     Pt Name: Bebo Myers  MRN: [id number]  [place] 1990  Date of evaluation: 8/21/22  PCP:  No primary care provider on file. CHIEF COMPLAINT       Chief Complaint   Patient presents with    Emesis     fatigue    Abdominal Pain       HISTORY OFPRESENT ILLNESS  (Location/Symptom, Timing/Onset, Context/Setting, Quality, Duration, Modifying Zelpha Hoots.)      Bebo Myers is a 36 y.o. male who presents with history of myocarditis just over a year ago. States he did not follow-up with cardiologist after preserved EF. Same day has no chest pain or shortness of breath but has nausea and vomiting. Also endorsing loose stools. Stating anything he attempts to eat \"goes right through him\" endorsing some minimal abdominal pain, generalized, cramping. Ongoing for the past 2 days. PAST MEDICAL / SURGICAL / SOCIAL / FAMILY HISTORY      has a past medical history of Myocarditis ([address] has no past surgical history on file. Social History     Socioeconomic History    Marital status: Single     Spouse name: Not on file    Number of children: Not on file    Years of education: Not on file    Highest education level: Not on file   Occupational History    Not on file   Tobacco Use    Smoking status: Never    Smokeless tobacco: Never   Vaping Use    Vaping Use: Some days    Substances: Nicotine   Substance and Sexual Activity    Alcohol use: No    Drug use: No    Sexual activity: Not on file   Other Topics Concern    Not on file   Social History Narrative    Not on file     Social Determinants of Health     Financial Resource Strain: Not on file   Food Insecurity: Not on file   Transportation Needs: Not on file   Physical Activity: Not on file   Stress: Not on file   Social Connections: Not on file   Intimate Partner Violence: Not on file   Housing Stability: Not on file       Family History   Family history unknown:  Yes       Allergies: Patient has no known allergies. Home Medications:  Prior to Admission medications    Medication Sig Start Date End Date Taking? Authorizing Provider   ondansetron (ZOFRAN ODT) 4 MG disintegrating tablet Take 1 tablet by mouth every 8 hours as needed for Nausea 8/21/22  Yes Normie Fabry, DO   dicyclomine (BENTYL) 10 MG capsule Take 1 capsule by mouth every 6 hours as needed (cramps) 8/21/22  Yes Normie Fabry, DO       REVIEW OF SYSTEMS    (2-9 systems for level 4, 10 or more for level 5)      Review of Systems   Constitutional:  Negative for chills and fever. Eyes:  Negative for discharge and redness. Respiratory:  Negative for shortness of breath. Cardiovascular:  Negative for chest pain. Gastrointestinal:  Positive for abdominal pain, diarrhea, nausea and vomiting. Genitourinary:  Negative for dysuria. Musculoskeletal:  Negative for arthralgias. Skin:  Negative for color change and rash. Allergic/Immunologic: Negative for environmental allergies. Neurological:  Negative for headaches. Psychiatric/Behavioral:  Negative for agitation and confusion. PHYSICAL EXAM   (up to 7 for level 4, 8 or more for level 5)     INITIAL VITALS:    height is 5' 11\" (1.803 m) and weight is 209 lb (94.8 kg). His oral temperature is 98.6 °F (37 °C). His blood pressure is 142/93 (abnormal) and his pulse is 86. His respiration is 20 and oxygen saturation is 98%. Physical Exam  Vitals and nursing note reviewed. Constitutional:       General: He is not in acute distress. Appearance: He is well-developed. He is not ill-appearing or toxic-appearing. HENT:      Head: Normocephalic and atraumatic. Nose: Nose normal.      Mouth/Throat:      Mouth: Mucous membranes are moist.   Eyes:      General: No scleral icterus. Conjunctiva/sclera: Conjunctivae normal.      Pupils: Pupils are equal, round, and reactive to light. Neck:      Trachea: No tracheal deviation.    Cardiovascular:      Rate and Rhythm: Normal rate and regular rhythm. Heart sounds: Normal heart sounds. No murmur heard. No friction rub. No gallop. Pulmonary:      Effort: Pulmonary effort is normal. No respiratory distress. Breath sounds: Normal breath sounds. No wheezing or rales. Abdominal:      General: There is no distension. Palpations: Abdomen is soft. There is no mass. Tenderness: There is abdominal tenderness. There is no guarding. Hernia: No hernia is present. Comments: Minimal generalized abdominal pain to palpation, no guarding or mass no rebound   Musculoskeletal:         General: Normal range of motion. Cervical back: Neck supple. Skin:     General: Skin is warm and dry. Findings: No erythema or rash. Neurological:      Mental Status: He is alert and oriented to person, place, and time. Psychiatric:         Behavior: Behavior normal.       DIFFERENTIAL  DIAGNOSIS     PLAN (LABS / IMAGING / EKG):  Orders Placed This Encounter   Procedures    COVID-19, Rapid    Flu A/B Ag Detection    XR CHEST PORTABLE    Troponin    Brain Natriuretic Peptide    Lipase    CBC with Auto Differential    Comprehensive Metabolic Panel    EKG 12 Lead    Insert peripheral IV       MEDICATIONS ORDERED:  Orders Placed This Encounter   Medications    ondansetron (ZOFRAN) injection 4 mg    dicyclomine (BENTYL) capsule 20 mg    ketorolac (TORADOL) injection 15 mg    ondansetron (ZOFRAN ODT) 4 MG disintegrating tablet     Sig: Take 1 tablet by mouth every 8 hours as needed for Nausea     Dispense:  12 tablet     Refill:  0    dicyclomine (BENTYL) 10 MG capsule     Sig: Take 1 capsule by mouth every 6 hours as needed (cramps)     Dispense:  20 capsule     Refill:  0       DDX: CHF versus repeat myocarditis versus viral illness versus COVID versus flu versus appendicitis    Initial MDM/Plan: 36 y.o. male who presents with history myocarditis with nausea, vomiting, diarrhea.   Most likely viral however given patient's history of myocarditis will check troponin and BNP. COVID and flu testing. No external signs of heart failure, no edema, normal vital signs, no crackles    DIAGNOSTIC RESULTS / EMERGENCY DEPARTMENT COURSE / MDM     LABS:  Labs Reviewed   CBC WITH AUTO DIFFERENTIAL - Abnormal; Notable for the following components:       Result Value    Lymphocytes 54 (*)     Absolute Lymph # 4.63 (*)     All other components within normal limits   COMPREHENSIVE METABOLIC PANEL - Abnormal; Notable for the following components:    Glucose 106 (*)     Total Bilirubin 0.25 (*)     All other components within normal limits   COVID-19, RAPID   RAPID INFLUENZA A/B ANTIGENS   TROPONIN   BRAIN NATRIURETIC PEPTIDE   LIPASE         RADIOLOGY:  XR CHEST PORTABLE   Final Result   No radiographic evidence of acute cardiopulmonary disease. EKG  Rhythm: normal sinus   Rate: normal  Axis: normal  Conduction: normal  ST Segments: no acute change  T Waves: no acute change  Q Waves: no acute change    Clinical Impression: Nonspecific, inverted T wave in 3, unchanged from previous    All EKG's are interpreted by the Emergency Department Physician who either signs or Co-signs this chart in the absence of a cardiologist.    EMERGENCY DEPARTMENT COURSE:     Troponin negative, BNP negative. Suspect gastroenteritis. No signs of myocarditis or heart failure. No chest pain no shortness of breath. Symptomatic treatment. Based on the low acuity of concerning symptoms and improvement of symptoms, patient will be discharged with follow up and prescription information listed in the Disposition section. Patient states they will follow-up with primary care physician and/or return to the emergency department should they experience a change or worsening of symptoms. Patient will be discharged with resources: summary of visit, instructions, follow-up information, prescriptions if necessary.   Patient/ family instructed to read discharge paperwork. All of their questions and concerns were addressed. Suspicion for any acute life-threatening processes is low. Patient voices understanding of plan. PROCEDURES:  None    CONSULTS:  None    CRITICAL CARE:  0    FINAL IMPRESSION      1. Nausea vomiting and diarrhea          DISPOSITION / PLAN     DISPOSITION Decision To Discharge 08/21/2022 09:11:18 PM    Discharge    PATIENTREFERRED TO:  No follow-up provider specified.     DISCHARGE MEDICATIONS:  Discharge Medication List as of 8/21/2022  9:11 PM        START taking these medications    Details   ondansetron (ZOFRAN ODT) 4 MG disintegrating tablet Take 1 tablet by mouth every 8 hours as needed for Nausea, Disp-12 tablet, R-0Print      dicyclomine (BENTYL) 10 MG capsule Take 1 capsule by mouth every 6 hours as needed (cramps), Disp-20 capsule, R-0Print             Jane Coles DO  EmergencyMedicine Attending    (Please note that portions of this note were completed with a voice recognition program.  Efforts were made to edit the dictations but occasionally words are mis-transcribed.)       Jane Coles DO  08/23/22 0031

## 2022-08-23 LAB
EKG ATRIAL RATE: 86 BPM
EKG P AXIS: 15 DEGREES
EKG P-R INTERVAL: 140 MS
EKG Q-T INTERVAL: 352 MS
EKG QRS DURATION: 84 MS
EKG QTC CALCULATION (BAZETT): 421 MS
EKG R AXIS: 20 DEGREES
EKG T AXIS: 31 DEGREES
EKG VENTRICULAR RATE: 86 BPM

## 2022-08-23 PROCEDURE — 93010 ELECTROCARDIOGRAM REPORT: CPT | Performed by: INTERNAL MEDICINE

## 2025-03-18 ENCOUNTER — HOSPITAL ENCOUNTER (OUTPATIENT)
Age: 35
Discharge: HOME | End: 2025-03-18

## 2025-03-18 LAB — RUBV IGG SERPL QL IA: 351 IU/ML

## 2025-03-18 PROCEDURE — 86762 RUBELLA ANTIBODY: CPT

## 2025-03-18 PROCEDURE — 86735 MUMPS ANTIBODY: CPT

## 2025-03-18 PROCEDURE — 86787 VARICELLA-ZOSTER ANTIBODY: CPT

## 2025-03-18 PROCEDURE — 86765 RUBEOLA ANTIBODY: CPT

## 2025-03-18 PROCEDURE — 86481 TB AG RESPONSE T-CELL SUSP: CPT

## 2025-03-18 PROCEDURE — 36415 COLL VENOUS BLD VENIPUNCTURE: CPT

## 2025-03-19 LAB
MEV IGG SER-ACNC: 3.81
MUV IGG SER IA-ACNC: 4.09
VZV IGG SER QL IA: 1.89

## 2025-03-21 LAB — T-SPOT TB TEST: NORMAL
